# Patient Record
Sex: MALE | Race: WHITE | Employment: OTHER | URBAN - METROPOLITAN AREA
[De-identification: names, ages, dates, MRNs, and addresses within clinical notes are randomized per-mention and may not be internally consistent; named-entity substitution may affect disease eponyms.]

---

## 2020-08-20 ENCOUNTER — HOSPITAL ENCOUNTER (OUTPATIENT)
Dept: NUCLEAR MEDICINE | Age: 70
Discharge: HOME | End: 2020-08-20
Attending: UROLOGY
Payer: MEDICARE

## 2020-08-20 DIAGNOSIS — N13.30 HYDRONEPHROSIS: ICD-10-CM

## 2020-08-20 DIAGNOSIS — Q62.39 UNSPECIFIED CONGENITAL OBSTRUCTIVE DEFECT OF RENAL PELVIS AND URETER: ICD-10-CM

## 2020-08-20 PROCEDURE — 78708 K FLOW/FUNCT IMAGE W/DRUG: CPT

## 2020-08-20 PROCEDURE — 74011250636 HC RX REV CODE- 250/636: Performed by: UROLOGY

## 2020-08-20 RX ORDER — FUROSEMIDE 10 MG/ML
20 INJECTION INTRAMUSCULAR; INTRAVENOUS ONCE
Status: COMPLETED | OUTPATIENT
Start: 2020-08-20 | End: 2020-08-20

## 2020-08-20 RX ADMIN — FUROSEMIDE 20 MG: 10 INJECTION, SOLUTION INTRAMUSCULAR; INTRAVENOUS at 13:35

## 2021-07-12 ENCOUNTER — OFFICE VISIT (OUTPATIENT)
Dept: ONCOLOGY | Age: 71
End: 2021-07-12
Payer: MEDICARE

## 2021-07-12 VITALS
RESPIRATION RATE: 14 BRPM | HEART RATE: 52 BPM | DIASTOLIC BLOOD PRESSURE: 73 MMHG | SYSTOLIC BLOOD PRESSURE: 123 MMHG | HEIGHT: 70 IN | TEMPERATURE: 98 F | WEIGHT: 146.8 LBS | OXYGEN SATURATION: 99 % | BODY MASS INDEX: 21.02 KG/M2

## 2021-07-12 DIAGNOSIS — D70.8 OTHER NEUTROPENIA (HCC): ICD-10-CM

## 2021-07-12 DIAGNOSIS — Z87.442 HISTORY OF RENAL CALCULI: ICD-10-CM

## 2021-07-12 DIAGNOSIS — D53.9 MACROCYTIC ANEMIA: Primary | ICD-10-CM

## 2021-07-12 PROCEDURE — 99204 OFFICE O/P NEW MOD 45 MIN: CPT | Performed by: INTERNAL MEDICINE

## 2021-07-12 PROCEDURE — G0463 HOSPITAL OUTPT CLINIC VISIT: HCPCS | Performed by: INTERNAL MEDICINE

## 2021-07-12 RX ORDER — HYDROCORTISONE 25 MG/G
CREAM TOPICAL
COMMUNITY
Start: 2021-04-04

## 2021-07-12 RX ORDER — GLUCOSAMINE SULFATE 1500 MG
POWDER IN PACKET (EA) ORAL DAILY
COMMUNITY

## 2021-07-12 RX ORDER — DOXYCYCLINE HYCLATE 50 MG/1
CAPSULE ORAL
COMMUNITY
Start: 2021-07-10

## 2021-07-12 RX ORDER — ASPIRIN 81 MG/1
TABLET ORAL DAILY
COMMUNITY

## 2021-07-12 RX ORDER — ENALAPRIL MALEATE 20 MG/1
TABLET ORAL
COMMUNITY
Start: 2021-05-12

## 2021-07-12 RX ORDER — BISMUTH SUBSALICYLATE 262 MG
1 TABLET,CHEWABLE ORAL DAILY
COMMUNITY

## 2021-07-12 RX ORDER — MULTIVITAMIN
2000 TABLET ORAL 3 TIMES DAILY
COMMUNITY

## 2021-07-12 RX ORDER — CHLORTHALIDONE 25 MG/1
TABLET ORAL
COMMUNITY
Start: 2021-05-12

## 2021-07-12 NOTE — PROGRESS NOTES
Chief Complaint   Patient presents with    New Patient    Other     low WBC count       Visit Vitals  /73 (BP 1 Location: Right arm, BP Patient Position: Sitting, BP Cuff Size: Small adult)   Pulse (!) 52   Temp 98 °F (36.7 °C) (Temporal)   Resp 14   Ht 5' 10\" (1.778 m)   Wt 146 lb 12.8 oz (66.6 kg)   SpO2 99%   BMI 21.06 kg/m²       1. Have you been to the ER, urgent care clinic since your last visit? Hospitalized since your last visit? No    2. Have you seen or consulted any other health care providers outside of the [address] [place] [address] since your last visit? Include any pap smears or colon screening.   YES, PCP and

## 2021-07-12 NOTE — LETTER
7/15/2021    Patient: Wing Dixon   YOB: 1950   Date of Visit: 7/12/2021     Lionel Bedolla MD  [address] [place] [address]  Via Fax: [phone]    Dear Lionel Bedolla MD,      Thank you for referring Mr. Carmen Shipley to [facility] for evaluation. My notes for this consultation are attached. If you have questions, please do not hesitate to call me. I look forward to following your patient along with you.       Sincerely,    Siomara Morin MD

## 2021-07-12 NOTE — PROGRESS NOTES
[facility] at [address], [address] [place] [address] [address]  Ladonna Millet: [phone]  F: [phone] Patient ID  Name: Coni Groves  YOB: 1950  MRN: [id number]  Referring Provider:   No referring provider defined for this encounter. Primary Care Provider:   Declan Castellanos MD       [facility] PATIENT VISIT   Date of Visit: 07/12/21  Reason for Visit:     Chief Complaint   Patient presents with    New Patient    Other     low WBC count     Subjective:   Coni Groves is a 73 y.o. M who presents for an initial evaluation for leukopenia. Patient reports that he is doing well overall. He reports adequate oral intake of food and hydration. Patient denies any bowel or bladder problems.  -He states that his lower blood counts started last Fall 2020 when his hemoglobin A1c. He note iced lower blood counts back in late 2020.   -This Spring he unfortunately dealt with renal calculi. He reports chronic kidney disease with a left kidney issue for years. He reportedly has a history of hydronephrosis. He reportedly had issues with pain in his left kidney as a 10 yr old. He reportedly was hospitalized and found to have imaging studies which led to ureter surgery at age 17 years. He notes resultant hypertension.  -He reports weight loss; attributes to the issues with his kidney stones. He notes 15 lbs. Weight loss; reports that he has slowly regained his weight.  -Denies any night sweats. Denies any chest pain. Denies any headaches.  -Denies any lymphadenopathy. He reports that he has had petechiae on his face. Denies any pruritus. -Reports no known chronic hepatitis C but reportedly serology positive for hepatitis C. Unclear if he had PCR testing but reports following with a hepatologist in the past.  -He reports a good appetite. New Patient  The history is provided by the patient and medical records.  Pertinent negatives include no chest pain, no abdominal pain and no shortness of breath. Other  Pertinent negatives include no chest pain, no abdominal pain and no shortness of breath. Past Medical History:   Diagnosis Date    Hepatitis C 2000    Prostate cancer [facility])       Past Surgical History:   Procedure Laterality Date    HX CYSTECTOMY      HX OTHER SURGICAL  1964    pyeloplasty    HX PROSTATECTOMY  2004    HX TONSILLECTOMY  1953      Social History     Tobacco Use    Smoking status: Never Smoker    Smokeless tobacco: Never Used   Substance Use Topics    Alcohol use: Not Currently      Family History   Problem Relation Age of Onset    Cancer Father         lung cancer    Cancer Paternal Uncle         intestinal cancer    Diabetes Paternal Uncle      Current Outpatient Medications   Medication Sig    chlorthalidone (HYGROTON) 25 mg tablet TAKE 1 TABLET BY MOUTH EVERY DAY    enalapril (VASOTEC) 20 mg tablet TAKE 1 TABLET BY MOUTH TWICE A DAY    doxycycline (VIBRAMYCIN) 50 mg capsule     hydrocortisone (HYTONE) 2.5 % topical cream APPLY TO FACE ON RASH EVERY DAY    multivitamin (ONE A DAY) tablet Take 1 Tablet by mouth daily.  cholecalciferol (Vitamin D3) 25 mcg (1,000 unit) cap Take  by mouth daily.  aspirin delayed-release 81 mg tablet Take  by mouth daily.  psyllium (METAMUCIL) packet Take 1 Packet by mouth daily.  cinnamon bark (Cinnamon) 500 mg cap Take 2,000 mg by mouth three (3) times daily. No current facility-administered medications for this visit. Allergies   Allergen Reactions    Other Food Other (comments)     Topical antibiotics - blistering of skin      Review of Systems   Constitutional: Positive for weight loss. Negative for fever. HENT: Negative for nosebleeds. Eyes: Negative for blurred vision. Respiratory: Negative for cough, hemoptysis and shortness of breath. Cardiovascular: Negative for chest pain and leg swelling. Gastrointestinal: Negative for abdominal pain, blood in stool and melena. Genitourinary: Negative for dysuria and hematuria. Musculoskeletal: Negative for joint pain and myalgias. Skin: Negative for itching and rash. Neurological: Negative for tremors and focal weakness. Objective:     Visit Vitals  /73 (BP 1 Location: Right arm, BP Patient Position: Sitting, BP Cuff Size: Small adult)   Pulse (!) 52   Temp 98 °F (36.7 °C) (Temporal)   Resp 14   Ht 5' 10\" (1.778 m)   Wt 146 lb 12.8 oz (66.6 kg)   SpO2 99%   BMI 21.06 kg/m²     ECOG PS: 1- Restricted in physically strenuous activity but ambulatory and able to carry out work of a light or sedentary nature, e.g., light house work, office work. Physical Exam  Constitutional:       General: He is not in acute distress. Appearance: He is not ill-appearing, toxic-appearing or diaphoretic. Eyes:      General: No scleral icterus. Conjunctiva/sclera: Conjunctivae normal.   Cardiovascular:      Heart sounds: Normal heart sounds. No friction rub. No gallop. Pulmonary:      Effort: No respiratory distress. Breath sounds: No wheezing, rhonchi or rales. Abdominal:      General: Bowel sounds are normal.      Palpations: Abdomen is soft. Tenderness: There is no abdominal tenderness. There is no guarding. Musculoskeletal:      Comments: No muscle pain on palpation. No temporal muscle wasting on inspection. Lymphadenopathy:      Comments: No cervical, supraclavicular,axillary, or inguinal lymph node enlargement or tenderness. Skin:     Coloration: Skin is not jaundiced. Findings: No rash. Neurological:      Mental Status: He is oriented to person, place, and time. Comments: Patient with normal gait. Psychiatric:         Mood and Affect: Mood normal.         Behavior: Behavior normal.     Results:     I personally reviewed referral labs: Patient brought his labs from his referring provider. Hx of mild neutropenia with ANC at 1500.   Hgb has waxed/waned in 12-13 range over past since November 2020.  I personally reviewed referral provider notes: I reviewed the lab results letter from Irvin Lewis MD to patient. It essentially mentions a Hemoglobin at 12.5 in June 2021 at the time of the results letter on 6/30/21. Assessment and Recommendations:   Diagnoses and all orders for this visit:    1. Macrocytic anemia   63-year-old male with a history of macrocytic anemia presents the hematology clinic for further evaluation. Discussed with patient I recommend we order a wide differential work-up for his anemia. We discussed that he has mild anemia. We discussed that anemia can be a production issue, destruction issue, blood loss issue. I would recommend he follow-up with his primary care provider regarding age-appropriate screening. Discussed that I cannot exclude an occult MDS or leukemia although there have been no clear signs of this at present. We discussed that I would not recommend a bone marrow biopsy with only mild cytopenias. I recommend observation at this time but surely if there are signs/symptoms concerning for progressive cytopenias then I would request that his other providers refer him to us sooner as needed. -     CBC WITH AUTOMATED DIFF; Future  -     PERIPHERAL SMEAR; Future  -     IRON PROFILE; Future  -     FERRITIN; Future  -     VITAMIN B12; Future  -     FOLATE; Future  -     RETICULOCYTE COUNT; Future  -     HAPTOGLOBIN; Future  -     LD; Future  -     BILIRUBIN, FRACTIONATED; Future  -     DIRECT RUFINA; Future  -     SPEP AND EMILIANA, SERUM; Future    2. Other neutropenia [facility])  Discussed with patient that he barely meets the criteria for mild laboratory neutropenia. Have ordered a smear and CBC repeat. -     PERIPHERAL SMEAR; Future    3. History of renal calculi  -I cannot exclude any underlying microscopic hematuria that could contribute to anemia. However, his marrow should likely counter any RBC loss with compensatory production to offset minor blood loss.  Will proceed with above lab work. Follow-up with his other providers. Follow-up and Dispositions  ·   Return in about 4 months (around 11/12/2021).            Signed By:   Davene Bence, MD   Hematology/Medical Oncology Provider  [facility]

## 2021-07-15 PROBLEM — D70.8 OTHER NEUTROPENIA (HCC): Status: ACTIVE | Noted: 2021-07-15

## 2021-07-15 PROBLEM — D70.8 OTHER NEUTROPENIA (HCC): Status: ACTIVE | Noted: 2021-07-12

## 2021-07-15 PROBLEM — D53.9 MACROCYTIC ANEMIA: Status: ACTIVE | Noted: 2021-07-15

## 2021-07-15 PROBLEM — D53.9 MACROCYTIC ANEMIA: Status: ACTIVE | Noted: 2021-07-12

## 2021-07-15 PROBLEM — Z87.442 HISTORY OF RENAL CALCULI: Status: ACTIVE | Noted: 2021-07-12

## 2021-11-11 ENCOUNTER — OFFICE VISIT (OUTPATIENT)
Dept: ONCOLOGY | Age: 71
End: 2021-11-11
Payer: MEDICARE

## 2021-11-11 VITALS
TEMPERATURE: 96.9 F | OXYGEN SATURATION: 100 % | HEIGHT: 70 IN | HEART RATE: 50 BPM | WEIGHT: 159 LBS | SYSTOLIC BLOOD PRESSURE: 110 MMHG | BODY MASS INDEX: 22.76 KG/M2 | DIASTOLIC BLOOD PRESSURE: 60 MMHG | RESPIRATION RATE: 18 BRPM

## 2021-11-11 DIAGNOSIS — R53.83 OTHER FATIGUE: ICD-10-CM

## 2021-11-11 DIAGNOSIS — D72.818 OTHER DECREASED WHITE BLOOD CELL COUNT: ICD-10-CM

## 2021-11-11 DIAGNOSIS — D75.89 MACROCYTOSIS WITHOUT ANEMIA: Primary | ICD-10-CM

## 2021-11-11 DIAGNOSIS — Z86.19 HISTORY OF HEPATITIS C: ICD-10-CM

## 2021-11-11 PROCEDURE — 99213 OFFICE O/P EST LOW 20 MIN: CPT | Performed by: INTERNAL MEDICINE

## 2021-11-11 PROCEDURE — G8536 NO DOC ELDER MAL SCRN: HCPCS | Performed by: INTERNAL MEDICINE

## 2021-11-11 PROCEDURE — 3017F COLORECTAL CA SCREEN DOC REV: CPT | Performed by: INTERNAL MEDICINE

## 2021-11-11 PROCEDURE — G8420 CALC BMI NORM PARAMETERS: HCPCS | Performed by: INTERNAL MEDICINE

## 2021-11-11 PROCEDURE — G8510 SCR DEP NEG, NO PLAN REQD: HCPCS | Performed by: INTERNAL MEDICINE

## 2021-11-11 PROCEDURE — G8427 DOCREV CUR MEDS BY ELIG CLIN: HCPCS | Performed by: INTERNAL MEDICINE

## 2021-11-11 PROCEDURE — 1101F PT FALLS ASSESS-DOCD LE1/YR: CPT | Performed by: INTERNAL MEDICINE

## 2021-11-11 NOTE — PATIENT INSTRUCTIONS
Orders Only on 07/12/2021   Component Date Value Ref Range Status    Immunoglobulin G, Qt. 07/12/2021 692  603 - 1,613 mg/dL Final    Immunoglobulin A, Qt. 07/12/2021 208  61 - 437 mg/dL Final    Immunoglobulin M, Qt. 07/12/2021 78  20 - 172 mg/dL Final    Protein, total 07/12/2021 6.5  6.0 - 8.5 g/dL Final    Albumin 07/12/2021 4.3  2.9 - 4.4 g/dL Final    Alpha-1-Globulin 07/12/2021 0.2  0.0 - 0.4 g/dL Final    Alpha-2-Globulin 07/12/2021 0.5  0.4 - 1.0 g/dL Final    Beta Globulin 07/12/2021 0.8  0.7 - 1.3 g/dL Final    Gamma Globulin 07/12/2021 0.7  0.4 - 1.8 g/dL Final    M-Kingsley 07/12/2021 Not Observed  Not Observed g/dL Final    Globulin, total 07/12/2021 2.2  2.2 - 3.9 g/dL Final    A/G ratio 07/12/2021 2.0* 0.7 - 1.7   Final    Immunofixation Result 07/12/2021 Comment    Final    Comment: (NOTE)  The immunofixation pattern appears unremarkable. Evidence of  monoclonal protein is not apparent.  RAFAEL Poly 07/12/2021 NEG   Final    Bilirubin, total 07/12/2021 0.5  0.2 - 1.0 MG/DL Final    Bilirubin, direct 07/12/2021 0.1  0.0 - 0.2 MG/DL Final    Bilirubin, indirect 07/12/2021 0.4  0.0 - 1.1 MG/DL Final    LD 07/12/2021 168  85 - 241 U/L Final    Haptoglobin 07/12/2021 73  30 - 200 mg/dL Final    Reticulocyte count 07/12/2021 1.4  0.7 - 2.1 % Final    Absolute Retic Cnt. 07/12/2021 0.0556  0.0260 - 0.0950 M/ul Final    Folate 07/12/2021 37.6* 5.0 - 21.0 ng/mL Final    Vitamin B12 07/12/2021 539  193 - 986 pg/mL Final    Ferritin 07/12/2021 84  26 - 388 NG/ML Final    Iron 07/12/2021 100  35 - 150 ug/dL Final    TIBC 07/12/2021 330  250 - 450 ug/dL Final    Iron % saturation 07/12/2021 30  20 - 50 % Final    PERIPHERAL SMEAR 07/12/2021 Pathologic examination results can be viewed in [facility] Chart Review under the Pathology tab.     Final    SEE COPATH CASE     WBC 07/12/2021 3.5* 4.1 - 11.1 K/uL Final    RBC 07/12/2021 3.97* 4.10 - 5.70 M/uL Final    HGB 07/12/2021 13.2  12.1 - 17.0 g/dL Final    HCT 07/12/2021 40.5  36.6 - 50.3 % Final    MCV 07/12/2021 102.0* 80.0 - 99.0 FL Final    MCH 07/12/2021 33.2  26.0 - 34.0 PG Final    MCHC 07/12/2021 32.6  30.0 - 36.5 g/dL Final    RDW 07/12/2021 11.7  11.5 - 14.5 % Final    PLATELET 72/64/2866 104  150 - 400 K/uL Final    MPV 07/12/2021 12.9  8.9 - 12.9 FL Final    NRBC 07/12/2021 0.0  0  WBC Final    ABSOLUTE NRBC 07/12/2021 0.00  0.00 - 0.01 K/uL Final    NEUTROPHILS 07/12/2021 54  32 - 75 % Final    LYMPHOCYTES 07/12/2021 32  12 - 49 % Final    MONOCYTES 07/12/2021 9  5 - 13 % Final    EOSINOPHILS 07/12/2021 3  0 - 7 % Final    BASOPHILS 07/12/2021 2* 0 - 1 % Final    IMMATURE GRANULOCYTES 07/12/2021 0  0.0 - 0.5 % Final    ABS. NEUTROPHILS 07/12/2021 1.9  1.8 - 8.0 K/UL Final    ABS. LYMPHOCYTES 07/12/2021 1.1  0.8 - 3.5 K/UL Final    ABS. MONOCYTES 07/12/2021 0.3  0.0 - 1.0 K/UL Final    ABS. EOSINOPHILS 07/12/2021 0.1  0.0 - 0.4 K/UL Final    ABS. BASOPHILS 07/12/2021 0.1  0.0 - 0.1 K/UL Final    ABS. IMM. GRANS. 07/12/2021 0.0  0.00 - 0.04 K/UL Final    DF 07/12/2021 AUTOMATED    Final     * * *FINAL PATHOLOGIC DIAGNOSIS* * *          Blood, smear:        Normochromic macrocytic erythrocytes. There is no significant   anisocytosis and there is no polychromasia or nucleated red blood cells. Schistocytes are not identified. Neutrophils are normal in number with few   smaller hypolobated hypogranular forms. Hypolobated eosinophils are also   noted. Morphologically unremarkable monocytes and lymphocytes are normal   in number. Platelets unremarkable. AOE6/2/57/9450   *Electronically Signed Out By Leyda Love. Diogenes Mae MD*       If you are having difficulty with continuity in your primary care here is a website for the primary care practice nearby    [phone]    [email or web address]

## 2021-11-11 NOTE — PROGRESS NOTES
[facility] at [address], [address] [facility] [address] [facility] [phone]  F: [phone] Patient ID  Name: Dereck Robledo  YOB: 1950  MRN: [id number]  Referring Provider:   No referring provider defined for this encounter. Primary Care Provider:   Veronica Edmonds MD       HEMATOLOGY/MEDICAL ONCOLOGY  NOTE   Date of Visit: 11/11/21  Reason for Evaluation:     Chief Complaint   Patient presents with    Follow-up     4 mo     Subjective:     History of Present Illness:     Dereck Robledo is a 73 y.o. M who presents for a follow-up evaluation for macrocytic anemia history and leukopenia. Fatigue:Grade 1 and Urinary Frequency/Urgency:Grade 1  Denies any known history of Hepatitis C chronic infection. He may have had a Hepatitis C exposure/infection reportedly. He had a blood [place]. Reports that he has had some chronic urinary issues that contribute to nocturia that interferes with his rest.  Overall, doing well; reportedly having high turnover in his primary care office. Past Medical History:   Diagnosis Date    Exposure to hepatitis C     Reportedly [facility] by [facility] (Dr. Claude Sato); reportedly never tested positive but had antibody positiviity. Had a blood exposure [place].     Kidney disease     Prostate cancer [facility])       Past Surgical History:   Procedure Laterality Date    HX CYSTECTOMY      HX OTHER SURGICAL  1964    pyeloplasty    HX PROSTATECTOMY  2004    HX TONSILLECTOMY  1953      Social History     Tobacco Use    Smoking status: Never Smoker    Smokeless tobacco: Never Used   Substance Use Topics    Alcohol use: Not Currently      Family History   Problem Relation Age of Onset    Cancer Father         lung cancer    Cancer Paternal Uncle         intestinal cancer    Diabetes Paternal Uncle     Cancer Paternal Grandmother         throat cancer    Cancer Paternal Grandfather         intestinal cancer     Current Outpatient Medications   Medication Sig    chlorthalidone (HYGROTON) 25 mg tablet TAKE 1 TABLET BY MOUTH EVERY DAY    enalapril (VASOTEC) 20 mg tablet TAKE 1 TABLET BY MOUTH TWICE A DAY    doxycycline (VIBRAMYCIN) 50 mg capsule     hydrocortisone (HYTONE) 2.5 % topical cream APPLY TO FACE ON RASH EVERY DAY    multivitamin (ONE A DAY) tablet Take 1 Tablet by mouth daily.  cholecalciferol (Vitamin D3) 25 mcg (1,000 unit) cap Take  by mouth daily.  aspirin delayed-release 81 mg tablet Take  by mouth daily.  psyllium (METAMUCIL) packet Take 1 Packet by mouth daily.  cinnamon bark (Cinnamon) 500 mg cap Take 2,000 mg by mouth three (3) times daily. No current facility-administered medications for this visit. Allergies   Allergen Reactions    Other Food Other (comments)     Topical antibiotics - blistering of skin      Review of Systems: A complete review of systems was obtained, reviewed. Pertinent findings reviewed above. Objective:     Visit Vitals  /60   Pulse (!) 50   Temp 96.9 °F (36.1 °C) (Oral)   Resp 18   Ht 5' 10\" (1.778 m)   Wt 159 lb (72.1 kg)   SpO2 100%   BMI 22.81 kg/m²     ECOG PS: 0- Fully active, able to carry on all pre-disease performance without restriction. Physical Exam  Constitutional: No acute distress. , Non-toxic appearance. and Non-diaphoretic. HENT: Normocephalic and atraumatic head. Eyes: Normal Conjunctivae. and Anicteric sclerae. Cardiovascular: Normal heart sounds., No pitting edema., No friction rub. and No gallops auscultated. Pulmonary: Normal Respiratory Effort., No wheezing., No rhonchi. and No rales. Abdominal: Normal bowel sounds. , Soft Abdomen to palpation. and No abdominal tenderness. Skin: No jaundice. and No rash. Musculoskeletal: No muscle pain on palpation. No temporal muscle wasting on inspection. Lymph: No cervical, supraclavicular lymph node enlargement or tenderness.   Neurological: Alert and oriented to person, place, and time. Mental status is at baseline. and No tremor on inspection. Psychiatric: mood normal. normal speech rate and normal affect    Results:     I personally reviewed Epic EHR labs/results below ordered at last visit:  Orders Only on 07/12/2021   Component Date Value Ref Range Status    Immunoglobulin G, Qt. 07/12/2021 692  603 - 1,613 mg/dL Final    Immunoglobulin A, Qt. 07/12/2021 208  61 - 437 mg/dL Final    Immunoglobulin M, Qt. 07/12/2021 78  20 - 172 mg/dL Final    Protein, total 07/12/2021 6.5  6.0 - 8.5 g/dL Final    Albumin 07/12/2021 4.3  2.9 - 4.4 g/dL Final    Alpha-1-Globulin 07/12/2021 0.2  0.0 - 0.4 g/dL Final    Alpha-2-Globulin 07/12/2021 0.5  0.4 - 1.0 g/dL Final    Beta Globulin 07/12/2021 0.8  0.7 - 1.3 g/dL Final    Gamma Globulin 07/12/2021 0.7  0.4 - 1.8 g/dL Final    M-Kingsley 07/12/2021 Not Observed  Not Observed g/dL Final    Globulin, total 07/12/2021 2.2  2.2 - 3.9 g/dL Final    A/G ratio 07/12/2021 2.0* 0.7 - 1.7   Final    Immunofixation Result 07/12/2021 Comment    Final    Comment: (NOTE)  The immunofixation pattern appears unremarkable. Evidence of  monoclonal protein is not apparent.       RAFAEL Poly 07/12/2021 NEG   Final    Bilirubin, total 07/12/2021 0.5  0.2 - 1.0 MG/DL Final    Bilirubin, direct 07/12/2021 0.1  0.0 - 0.2 MG/DL Final    Bilirubin, indirect 07/12/2021 0.4  0.0 - 1.1 MG/DL Final    LD 07/12/2021 168  85 - 241 U/L Final    Haptoglobin 07/12/2021 73  30 - 200 mg/dL Final    Reticulocyte count 07/12/2021 1.4  0.7 - 2.1 % Final    Absolute Retic Cnt. 07/12/2021 0.0556  0.0260 - 0.0950 M/ul Final    Folate 07/12/2021 37.6* 5.0 - 21.0 ng/mL Final    Vitamin B12 07/12/2021 539  193 - 986 pg/mL Final    Ferritin 07/12/2021 84  26 - 388 NG/ML Final    Iron 07/12/2021 100  35 - 150 ug/dL Final    TIBC 07/12/2021 330  250 - 450 ug/dL Final    Iron % saturation 07/12/2021 30  20 - 50 % Final    PERIPHERAL SMEAR 07/12/2021 Pathologic examination results can be viewed in [facility] Chart Review under the Pathology tab. Final    SEE COPATH CASE     WBC 07/12/2021 3.5* 4.1 - 11.1 K/uL Final    RBC 07/12/2021 3.97* 4.10 - 5.70 M/uL Final    HGB 07/12/2021 13.2  12.1 - 17.0 g/dL Final    HCT 07/12/2021 40.5  36.6 - 50.3 % Final    MCV 07/12/2021 102.0* 80.0 - 99.0 FL Final    MCH 07/12/2021 33.2  26.0 - 34.0 PG Final    MCHC 07/12/2021 32.6  30.0 - 36.5 g/dL Final    RDW 07/12/2021 11.7  11.5 - 14.5 % Final    PLATELET 88/72/6180 122  150 - 400 K/uL Final    MPV 07/12/2021 12.9  8.9 - 12.9 FL Final    NRBC 07/12/2021 0.0  0  WBC Final    ABSOLUTE NRBC 07/12/2021 0.00  0.00 - 0.01 K/uL Final    NEUTROPHILS 07/12/2021 54  32 - 75 % Final    LYMPHOCYTES 07/12/2021 32  12 - 49 % Final    MONOCYTES 07/12/2021 9  5 - 13 % Final    EOSINOPHILS 07/12/2021 3  0 - 7 % Final    BASOPHILS 07/12/2021 2* 0 - 1 % Final    IMMATURE GRANULOCYTES 07/12/2021 0  0.0 - 0.5 % Final    ABS. NEUTROPHILS 07/12/2021 1.9  1.8 - 8.0 K/UL Final    ABS. LYMPHOCYTES 07/12/2021 1.1  0.8 - 3.5 K/UL Final    ABS. MONOCYTES 07/12/2021 0.3  0.0 - 1.0 K/UL Final    ABS. EOSINOPHILS 07/12/2021 0.1  0.0 - 0.4 K/UL Final    ABS. BASOPHILS 07/12/2021 0.1  0.0 - 0.1 K/UL Final    ABS. IMM. GRANS. 07/12/2021 0.0  0.00 - 0.04 K/UL Final    DF 07/12/2021 AUTOMATED    Final   * * *FINAL PATHOLOGIC DIAGNOSIS* * *          Blood, smear:        Normochromic macrocytic erythrocytes. There is no significant   anisocytosis and there is no polychromasia or nucleated red blood cells. Schistocytes are not identified. Neutrophils are normal in number with few   smaller hypolobated hypogranular forms. Hypolobated eosinophils are also   noted. Morphologically unremarkable monocytes and lymphocytes are normal   in number. Platelets unremarkable. IVK4/2/59/0042   *Electronically Signed Out By Alma Parks.  Stephen Merritt MD*       Assessment and Recommendations:     Diagnoses and all orders for this visit:    1. Macrocytosis without anemia  No further work-up currently planned. Patient will follow-up with us in about 6 months to make sure he transitions to a new PCP. We may repeat a CBC at next visit. 2. Other decreased white blood cell count  Neutrophils normal; slight decrease in total WBC. 3. History of hepatitis C  Reportedly was evaluated by [address]; he states that he did not have any evidence of disease. I don't have any record of this and it remains unclear that we will be able to obtain these records. He can discuss this further in primary care in the event they would want to repeat any Hep C PCR testing. 4. Other fatigue  -     CBC WITH AUTOMATED DIFF; Future  -     TSH 3RD GENERATION; Future  Will repeat CBC, check TSH especially with macrocytosis history. Follow-up and Dispositions    · Return in about 6 months (around 5/11/2022).            Signed By:   Marely Marks MD

## 2021-11-11 NOTE — PROGRESS NOTES
Zach Mesa is a 73 y.o. male    Chief Complaint   Patient presents with    Follow-up     4 mo       Visit Vitals  /60   Pulse (!) 50   Temp 96.9 °F (36.1 °C) (Oral)   Resp 18   Ht 5' 10\" (1.778 m)   Wt 159 lb (72.1 kg)   SpO2 100%   BMI 22.81 kg/m²       3 most recent PHQ Screens 11/11/2021   Little interest or pleasure in doing things Not at all   Feeling down, depressed, irritable, or hopeless Not at all   Total Score PHQ 2 0       Fall Risk Assessment, last 12 mths 11/11/2021   Able to walk? No   Fall in past 12 months? -   Do you feel unsteady? -   Are you worried about falling -   Is TUG test greater than 12 seconds? -   Is the gait abnormal? -   Number of falls in past 12 months -   Fall with injury? -       No flowsheet data found. 1. Have you been to the ER, urgent care clinic since your last visit? Hospitalized since your last visit? No     2. Have you seen or consulted any other health care providers outside of the [address] [place] [address] since your last visit? Include any pap smears or colon screening.  No

## 2021-11-19 PROBLEM — R53.83 OTHER FATIGUE: Status: ACTIVE | Noted: 2021-11-19

## 2021-11-19 PROBLEM — R53.83 OTHER FATIGUE: Status: ACTIVE | Noted: 2021-11-11

## 2022-03-18 PROBLEM — D70.8 OTHER NEUTROPENIA (HCC): Status: ACTIVE | Noted: 2021-07-12

## 2022-03-19 PROBLEM — Z87.442 HISTORY OF RENAL CALCULI: Status: ACTIVE | Noted: 2021-07-12

## 2022-03-19 PROBLEM — D53.9 MACROCYTIC ANEMIA: Status: ACTIVE | Noted: 2021-07-12

## 2022-03-19 PROBLEM — R53.83 OTHER FATIGUE: Status: ACTIVE | Noted: 2021-11-11

## 2022-03-20 PROBLEM — Z86.19 HISTORY OF HEPATITIS C: Status: ACTIVE | Noted: 2021-11-11

## 2022-05-12 ENCOUNTER — OFFICE VISIT (OUTPATIENT)
Dept: ONCOLOGY | Age: 72
End: 2022-05-12
Payer: MEDICARE

## 2022-05-12 VITALS
TEMPERATURE: 97.5 F | HEART RATE: 48 BPM | RESPIRATION RATE: 16 BRPM | DIASTOLIC BLOOD PRESSURE: 54 MMHG | WEIGHT: 152.2 LBS | BODY MASS INDEX: 21.79 KG/M2 | HEIGHT: 70 IN | SYSTOLIC BLOOD PRESSURE: 102 MMHG | OXYGEN SATURATION: 100 %

## 2022-05-12 DIAGNOSIS — R79.9 ABNORMAL BLOOD SMEAR: ICD-10-CM

## 2022-05-12 DIAGNOSIS — D75.89 MACROCYTOSIS WITHOUT ANEMIA: Primary | ICD-10-CM

## 2022-05-12 PROCEDURE — 3017F COLORECTAL CA SCREEN DOC REV: CPT | Performed by: INTERNAL MEDICINE

## 2022-05-12 PROCEDURE — G8420 CALC BMI NORM PARAMETERS: HCPCS | Performed by: INTERNAL MEDICINE

## 2022-05-12 PROCEDURE — G8427 DOCREV CUR MEDS BY ELIG CLIN: HCPCS | Performed by: INTERNAL MEDICINE

## 2022-05-12 PROCEDURE — G8536 NO DOC ELDER MAL SCRN: HCPCS | Performed by: INTERNAL MEDICINE

## 2022-05-12 PROCEDURE — G0463 HOSPITAL OUTPT CLINIC VISIT: HCPCS | Performed by: INTERNAL MEDICINE

## 2022-05-12 PROCEDURE — 1101F PT FALLS ASSESS-DOCD LE1/YR: CPT | Performed by: INTERNAL MEDICINE

## 2022-05-12 PROCEDURE — G8510 SCR DEP NEG, NO PLAN REQD: HCPCS | Performed by: INTERNAL MEDICINE

## 2022-05-12 PROCEDURE — 99213 OFFICE O/P EST LOW 20 MIN: CPT | Performed by: INTERNAL MEDICINE

## 2022-05-12 RX ORDER — DESONIDE 0.5 MG/G
CREAM TOPICAL
COMMUNITY
End: 2022-05-12

## 2022-05-12 NOTE — PROGRESS NOTES
[facility] [place] at [address], [address] [facility]: [phone]  F: [phone] Patient ID  Name: Tootie Parker  YOB: 1950  MRN: [id number]  Referring Provider:   No referring provider defined for this encounter. Primary Care Provider:   Virginie Varela MD       HEMATOLOGY/MEDICAL ONCOLOGY  NOTE   Date of Visit: 05/12/22  Reason for Evaluation:     Chief Complaint   Patient presents with    Follow-up     Patient presents as a follow-up for history of macrocytic anemia and leukopenia. He denies pain. Subjective:     History of Present Illness:     Tootie Parker is a 81 y.o. M who presents for a follow-up evaluation for macrocytosis and other low white blood cell count. Patient overall reports feeling stable. He reports feeling well. He is without any clear infections. He reports that he is without any significant infections. He denies any presyncopal complaints with his bradycardia. He is following with cardiology and has weighed consideration for a pacemaker but has decided for now not to pursue. Fatigue:Grade 1  Insomnia:Grade 1  Rapid Heartbeat:Grade 1  Urinary Frequency/Urgency:Grade 1  Erectile Impotence:Grade 3      Past Medical History:   Diagnosis Date    Exposure to hepatitis C     Reportedly [facility] by [facility] (Dr. Myrna Can); reportedly never tested positive but had antibody positiviity. 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     Kidney disease     Osteopenia     Prostate cancer [facility])       Past Surgical History:   Procedure Laterality Date    HX CYSTECTOMY      HX OTHER SURGICAL  1964    pyeloplasty    HX PROSTATECTOMY  2004    HX TONSILLECTOMY  1953      Social History     Tobacco Use    Smoking status: Never Smoker    Smokeless tobacco: Never Used   Substance Use Topics    Alcohol use: Not Currently      Family History   Problem Relation Age of Onset    Cancer Father         lung cancer    Cancer Paternal Uncle         intestinal cancer    Diabetes Paternal Uncle     Cancer Paternal Grandmother         throat cancer    Cancer Paternal Grandfather         intestinal cancer     Current Outpatient Medications   Medication Sig    enalapril (VASOTEC) 20 mg tablet TAKE 1 TABLET BY MOUTH TWICE A DAY    doxycycline (VIBRAMYCIN) 50 mg capsule     hydrocortisone (HYTONE) 2.5 % topical cream APPLY TO FACE ON RASH EVERY DAY    multivitamin (ONE A DAY) tablet Take 1 Tablet by mouth daily.  cholecalciferol (Vitamin D3) 25 mcg (1,000 unit) cap Take  by mouth daily.  cinnamon bark (Cinnamon) 500 mg cap Take 2,000 mg by mouth three (3) times daily.  chlorthalidone (HYGROTON) 25 mg tablet TAKE 1 TABLET BY MOUTH EVERY DAY    aspirin delayed-release 81 mg tablet Take  by mouth daily. (Patient not taking: Reported on 5/12/2022)    psyllium (METAMUCIL) packet Take 1 Packet by mouth daily. (Patient not taking: Reported on 5/12/2022)     No current facility-administered medications for this visit. Allergies   Allergen Reactions    Other Food Other (comments)     Topical antibiotics - blistering of skin    Other Plant, Animal, Environmental Sneezing     Runny nose, watery eyes      Review of Systems Provided by:  Patient  Review of Systems: A complete review of systems was obtained, reviewed. Pertinent findings reviewed above. Objective:     Visit Vitals  BP (!) 102/54   Pulse (!) 48   Temp 97.5 °F (36.4 °C)   Resp 16   Ht 5' 10\" (1.778 m)   Wt 152 lb 3.2 oz (69 kg)   SpO2 100%   BMI 21.84 kg/m²     ECOG PS: 0- Fully active, able to carry on all pre-disease performance without restriction. Physical Exam  Constitutional: No acute distress. and Non-toxic appearance. HENT: Normocephalic and atraumatic head. and Wearing a mask during COVID-19 precautions. Eyes: Normal Conjunctivae. Anicteric sclerae. Cardiovascular: S1,S2 auscultated. No pitting edema. No friction rub. Pulmonary: Normal Respiratory Effort. No wheezing. No rhonchi. No rales. Abdominal: Normal bowel sounds. Soft Abdomen to palpation. No abdominal tenderness. No hepatosplenomegaly. Skin: No jaundice. No rash. No petechiae. Musculoskeletal: No muscle pain on palpation. No temporal muscle wasting on inspection. Lymph: No cervical, supraclavicular,axillary, or inguinal lymph node enlargement or tenderness. Neurological: Alert and oriented. No tremor on inspection. Normal Gait. Psychiatric: mood normal. normal speech rate normal affect    Results:     I personally reviewed Epic EHR labs/results below ordered at last visit:  No visits with results within 5 Month(s) from this visit. Latest known visit with results is:   Orders Only on 11/11/2021   Component Date Value Ref Range Status    TSH 11/11/2021 2.10  0.36 - 3.74 uIU/mL Final    Comment:   Due to TSH heterogeneity, both structurally and degree of glycosylation,  monoclonal antibodies used in the TSH assay may not accurately quantitate TSH.   Therefore, this result should be correlated with clinical findings as well as  with other assessments of thyroid function, e.g., free T4, free T3.      WBC 11/11/2021 4.0* 4.1 - 11.1 K/uL Final    RBC 11/11/2021 3.77* 4.10 - 5.70 M/uL Final    HGB 11/11/2021 12.6  12.1 - 17.0 g/dL Final    HCT 11/11/2021 37.8  36.6 - 50.3 % Final    MCV 11/11/2021 100.3* 80.0 - 99.0 FL Final    MCH 11/11/2021 33.4  26.0 - 34.0 PG Final    MCHC 11/11/2021 33.3  30.0 - 36.5 g/dL Final    RDW 11/11/2021 11.8  11.5 - 14.5 % Final    PLATELET 14/38/3326 827  150 - 400 K/uL Final    MPV 11/11/2021 12.8  8.9 - 12.9 FL Final    NRBC 11/11/2021 0.0  0  WBC Final    ABSOLUTE NRBC 11/11/2021 0.00  0.00 - 0.01 K/uL Final    NEUTROPHILS 11/11/2021 50  32 - 75 % Final    LYMPHOCYTES 11/11/2021 32  12 - 49 % Final    MONOCYTES 11/11/2021 10  5 - 13 % Final    EOSINOPHILS 11/11/2021 6  0 - 7 % Final    BASOPHILS 11/11/2021 2* 0 - 1 % Final    IMMATURE GRANULOCYTES 11/11/2021 0  0.0 - 0.5 % Final    ABS. NEUTROPHILS 11/11/2021 2.0  1.8 - 8.0 K/UL Final    ABS. LYMPHOCYTES 11/11/2021 1.3  0.8 - 3.5 K/UL Final    ABS. MONOCYTES 11/11/2021 0.4  0.0 - 1.0 K/UL Final    ABS. EOSINOPHILS 11/11/2021 0.2  0.0 - 0.4 K/UL Final    ABS. BASOPHILS 11/11/2021 0.1  0.0 - 0.1 K/UL Final    ABS. IMM. GRANS. 11/11/2021 0.0  0.00 - 0.04 K/UL Final    DF 11/11/2021 AUTOMATED    Final   * * *FINAL PATHOLOGIC DIAGNOSIS* * *          Blood, smear:        Normochromic macrocytic erythrocytes. There is no significant   anisocytosis and there is no polychromasia or nucleated red blood cells. Schistocytes are not identified. Neutrophils are normal in number with few   smaller hypolobated hypogranular forms. Hypolobated eosinophils are also   noted. Morphologically unremarkable monocytes and lymphocytes are normal   in number. Platelets unremarkable. [id number]   *Electronically Signed Out By Olga Wallis. Linda Floyd MD*       Assessment and Recommendations:     1. Macrocytosis without anemia  Slight macrocytosis but no anemia in November 2021. Will repeat CBC today. - CBC WITH AUTOMATED DIFF; Future  - PERIPHERAL SMEAR; Future    2. Abnormal blood smear  Discussed that July smear noted \"few hypolobated,hypogranulated neutrophils. \" Will repeat a smear but no plans for a bone marrow biopys today. Follow-up and Dispositions    · Return in about 6 months (around 11/12/2022).            Ethan Morelos MD  Hematology/Medical Oncology Provider  [facility]  P: [phone]      Signed By:   Queenie Orr MD

## 2022-05-12 NOTE — PROGRESS NOTES
1. Have you been to the ER, urgent care clinic since your last visit? Hospitalized since your last visit? No    2. Have you seen or consulted any other health care providers outside of the [address] [place] [address] since your last visit? Include any pap smears or colon screening. Yes Cardiology, Dr. Melo Carter, January and March of 2022, stress test and echocardiogram, seeing next month (June 2022) to discuss possible pacemaker        Vitals:    05/12/22 0840   BP: (!) 102/54   Pulse: (!) 48   Resp: 16   Temp: 97.5 °F (36.4 °C)   SpO2: 100%   Weight: 152 lb 3.2 oz (69 kg)   Height: 5' 10\" (1.778 m)           Chief Complaint   Patient presents with    Follow-up     Patient presents as a follow-up for history of macrocytic anemia and leukopenia. He denies pain.

## 2022-05-12 NOTE — LETTER
5/12/2022    Patient: Maddison Cline   YOB: 1950   Date of Visit: 5/12/2022     Dion Schafer MD  [address] [facility] [address]  Via Fax: [phone]    Dear Dion Schafer MD,      Thank you for referring Mr. Aaron Burns to [facility] for evaluation. My notes for this consultation are attached. If you have questions, please do not hesitate to call me. I look forward to following your patient along with you.       Sincerely,    Oneyda Simmons MD

## 2022-05-16 ENCOUNTER — TELEPHONE (OUTPATIENT)
Dept: ONCOLOGY | Age: 72
End: 2022-05-16

## 2022-05-16 NOTE — TELEPHONE ENCOUNTER
[address] at [facility]  [phone]    05/16/22 9:08 AM - Patient's CBC and peripheral smear results placed in envelope with outgoing mail.

## 2022-08-03 RX ORDER — HEPARIN 100 UNIT/ML
500 SYRINGE INTRAVENOUS AS NEEDED
Start: 2022-08-04

## 2022-08-03 RX ORDER — SODIUM CHLORIDE 9 MG/ML
5-250 INJECTION, SOLUTION INTRAVENOUS AS NEEDED
OUTPATIENT
Start: 2022-08-04

## 2022-08-03 RX ORDER — SODIUM CHLORIDE 0.9 % (FLUSH) 0.9 %
5-40 SYRINGE (ML) INJECTION AS NEEDED
OUTPATIENT
Start: 2022-08-04

## 2022-08-03 RX ORDER — SODIUM CHLORIDE 9 MG/ML
5-40 INJECTION INTRAMUSCULAR; INTRAVENOUS; SUBCUTANEOUS AS NEEDED
OUTPATIENT
Start: 2022-08-04

## 2022-08-04 ENCOUNTER — HOSPITAL ENCOUNTER (OUTPATIENT)
Dept: INFUSION THERAPY | Age: 72
Discharge: HOME | End: 2022-08-04
Payer: MEDICARE

## 2022-08-04 DIAGNOSIS — D75.89 MACROCYTOSIS WITHOUT ANEMIA: Primary | ICD-10-CM

## 2022-08-04 DIAGNOSIS — R79.9 ABNORMAL BLOOD SMEAR: ICD-10-CM

## 2022-08-04 LAB
BASOPHILS # BLD: 0.1 K/UL (ref 0–0.1)
BASOPHILS NFR BLD: 2 % (ref 0–1)
DIFFERENTIAL METHOD BLD: ABNORMAL
EOSINOPHIL # BLD: 0.1 K/UL (ref 0–0.4)
EOSINOPHIL NFR BLD: 4 % (ref 0–7)
ERYTHROCYTE [DISTWIDTH] IN BLOOD BY AUTOMATED COUNT: 11.6 % (ref 11.5–14.5)
HCT VFR BLD AUTO: 38.2 % (ref 36.6–50.3)
HGB BLD-MCNC: 12.9 G/DL (ref 12.1–17)
IMM GRANULOCYTES # BLD AUTO: 0 K/UL (ref 0–0.04)
IMM GRANULOCYTES NFR BLD AUTO: 0 % (ref 0–0.5)
LYMPHOCYTES # BLD: 1.2 K/UL (ref 0.8–3.5)
LYMPHOCYTES NFR BLD: 37 % (ref 12–49)
MCH RBC QN AUTO: 33.2 PG (ref 26–34)
MCHC RBC AUTO-ENTMCNC: 33.8 G/DL (ref 30–36.5)
MCV RBC AUTO: 98.2 FL (ref 80–99)
MONOCYTES # BLD: 0.4 K/UL (ref 0–1)
MONOCYTES NFR BLD: 12 % (ref 5–13)
NEUTS SEG # BLD: 1.5 K/UL (ref 1.8–8)
NEUTS SEG NFR BLD: 45 % (ref 32–75)
NRBC # BLD: 0 K/UL (ref 0–0.01)
NRBC BLD-RTO: 0 PER 100 WBC
PLATELET # BLD AUTO: 142 K/UL (ref 150–400)
PMV BLD AUTO: 11.4 FL (ref 8.9–12.9)
RBC # BLD AUTO: 3.89 M/UL (ref 4.1–5.7)
WBC # BLD AUTO: 3.2 K/UL (ref 4.1–11.1)

## 2022-08-04 PROCEDURE — 36415 COLL VENOUS BLD VENIPUNCTURE: CPT

## 2022-08-04 PROCEDURE — 85025 COMPLETE CBC W/AUTO DIFF WBC: CPT

## 2022-08-05 ENCOUNTER — TELEPHONE (OUTPATIENT)
Dept: ONCOLOGY | Age: 72
End: 2022-08-05

## 2022-08-05 NOTE — TELEPHONE ENCOUNTER
[address] at [place]  [phone]    08/05/22 1:33 PM - Copy of patient's CBC results have been placed in the outgoing mail. Copy of results have also been faxed to patient's [facility] office at fax #[phone]. Attempted to call pt's mother back, unable to reach her due to busy signal or call not going through.

## 2022-08-05 NOTE — PROGRESS NOTES
[address] at [facility]  [phone]    08/05/22 1:17 PM - Called and spoke with the patient. Patient's ID verified x 2. Advised patient of Dr. Odalys Han result note. Patient inquired about blasts being seen. Advised patient Dr. Jose Sanchez did not mention this. Reiterated result note to the patient. The patient voiced understanding and gratitude for the call. Advised this nurse will mail him a copy of the results and fax a copy to his PCP's office. No further questions or concerns.

## 2022-10-27 ENCOUNTER — TELEPHONE (OUTPATIENT)
Dept: ONCOLOGY | Age: 72
End: 2022-10-27

## 2022-10-27 NOTE — TELEPHONE ENCOUNTER
Called patient advised we will order labs to be completed before his appointment on 11/10/22. Can you please order labs for this patient. I will call him again after they are ordered.      Thank you  Omar Osullivan

## 2022-10-27 NOTE — TELEPHONE ENCOUNTER
Patient called and stated that he was wondering why does he get his blood work done after his visits and not before so his appointment so him and Dr. Danny Del Rosario can go over his results during his appointment.     # [phone]

## 2022-11-02 DIAGNOSIS — D53.9 MACROCYTIC ANEMIA: Primary | ICD-10-CM

## 2022-11-02 DIAGNOSIS — D72.818 OTHER DECREASED WHITE BLOOD CELL COUNT: ICD-10-CM

## 2022-11-04 LAB
BASOPHILS # BLD AUTO: 0.1 X10E3/UL (ref 0–0.2)
BASOPHILS NFR BLD AUTO: 1 %
EOSINOPHIL # BLD AUTO: 0.1 X10E3/UL (ref 0–0.4)
EOSINOPHIL NFR BLD AUTO: 3 %
ERYTHROCYTE [DISTWIDTH] IN BLOOD BY AUTOMATED COUNT: 11.7 % (ref 11.6–15.4)
HCT VFR BLD AUTO: 38.4 % (ref 37.5–51)
HGB BLD-MCNC: 13 G/DL (ref 13–17.7)
IMM GRANULOCYTES # BLD AUTO: 0 X10E3/UL (ref 0–0.1)
IMM GRANULOCYTES NFR BLD AUTO: 0 %
KAPPA LC FREE SER-MCNC: 18.2 MG/L (ref 3.3–19.4)
KAPPA LC FREE/LAMBDA FREE SER: 1.98 {RATIO} (ref 0.26–1.65)
LAMBDA LC FREE SERPL-MCNC: 9.2 MG/L (ref 5.7–26.3)
LYMPHOCYTES # BLD AUTO: 1.4 X10E3/UL (ref 0.7–3.1)
LYMPHOCYTES NFR BLD AUTO: 40 %
MCH RBC QN AUTO: 33.5 PG (ref 26.6–33)
MCHC RBC AUTO-ENTMCNC: 33.9 G/DL (ref 31.5–35.7)
MCV RBC AUTO: 99 FL (ref 79–97)
MONOCYTES # BLD AUTO: 0.4 X10E3/UL (ref 0.1–0.9)
MONOCYTES NFR BLD AUTO: 11 %
NEUTROPHILS # BLD AUTO: 1.6 X10E3/UL (ref 1.4–7)
NEUTROPHILS NFR BLD AUTO: 45 %
PLATELET # BLD AUTO: 144 X10E3/UL (ref 150–450)
RBC # BLD AUTO: 3.88 X10E6/UL (ref 4.14–5.8)
WBC # BLD AUTO: 3.5 X10E3/UL (ref 3.4–10.8)

## 2022-11-10 ENCOUNTER — OFFICE VISIT (OUTPATIENT)
Dept: ONCOLOGY | Age: 72
End: 2022-11-10
Payer: MEDICARE

## 2022-11-10 VITALS
HEART RATE: 64 BPM | TEMPERATURE: 97.5 F | RESPIRATION RATE: 16 BRPM | OXYGEN SATURATION: 100 % | SYSTOLIC BLOOD PRESSURE: 125 MMHG | DIASTOLIC BLOOD PRESSURE: 74 MMHG

## 2022-11-10 DIAGNOSIS — D75.89 MACROCYTOSIS WITHOUT ANEMIA: Primary | ICD-10-CM

## 2022-11-10 DIAGNOSIS — Z86.19 HISTORY OF HEPATITIS C: ICD-10-CM

## 2022-11-10 PROCEDURE — 1101F PT FALLS ASSESS-DOCD LE1/YR: CPT | Performed by: INTERNAL MEDICINE

## 2022-11-10 PROCEDURE — 3017F COLORECTAL CA SCREEN DOC REV: CPT | Performed by: INTERNAL MEDICINE

## 2022-11-10 PROCEDURE — G8420 CALC BMI NORM PARAMETERS: HCPCS | Performed by: INTERNAL MEDICINE

## 2022-11-10 PROCEDURE — G0463 HOSPITAL OUTPT CLINIC VISIT: HCPCS | Performed by: INTERNAL MEDICINE

## 2022-11-10 PROCEDURE — G8427 DOCREV CUR MEDS BY ELIG CLIN: HCPCS | Performed by: INTERNAL MEDICINE

## 2022-11-10 PROCEDURE — 99214 OFFICE O/P EST MOD 30 MIN: CPT | Performed by: INTERNAL MEDICINE

## 2022-11-10 PROCEDURE — G8536 NO DOC ELDER MAL SCRN: HCPCS | Performed by: INTERNAL MEDICINE

## 2022-11-10 PROCEDURE — 1123F ACP DISCUSS/DSCN MKR DOCD: CPT | Performed by: INTERNAL MEDICINE

## 2022-11-10 PROCEDURE — G8510 SCR DEP NEG, NO PLAN REQD: HCPCS | Performed by: INTERNAL MEDICINE

## 2022-11-10 NOTE — PROGRESS NOTES
Rm    Chief Complaint   Patient presents with    Follow-up        Visit Vitals  /74 (BP 1 Location: Right upper arm, BP Patient Position: Sitting, BP Cuff Size: Adult)   Pulse 64   Temp 97.5 °F (36.4 °C) (Oral)   Resp 16   SpO2 100%        1. Have you been to the ER, urgent care clinic since your last visit? Hospitalized since your last visit? No    2. Have you seen or consulted any other health care providers outside of the [address] [place] [address] since your last visit? Include any pap smears or colon screening. No     Health Maintenance Due   Topic Date Due    Pneumococcal 65+ years (1 - PCV) Never done    Shingrix Vaccine Age 50> (1 of 2) Never done    DTaP/Tdap/Td series (1 - Tdap) Never done    Lipid Screen  Never done    Colorectal Cancer Screening Combo  Never done    Medicare Yearly Exam  Never done    COVID-19 Vaccine (3 - Pfizer risk series) 04/21/2021    Flu Vaccine (1) 08/01/2022        3 most recent PHQ Screens 11/10/2022   Little interest or pleasure in doing things Not at all   Feeling down, depressed, irritable, or hopeless Not at all   Total Score PHQ 2 0        Fall Risk Assessment, last 12 mths 11/10/2022   Able to walk? Yes   Fall in past 12 months? 0   Do you feel unsteady? 0   Are you worried about falling 0   Is TUG test greater than 12 seconds? -   Is the gait abnormal? -   Number of falls in past 12 months -   Fall with injury? -       No flowsheet data found.

## 2022-11-10 NOTE — PROGRESS NOTES
[facility] [place] at [address], [facility] [facility]: [phone]  F: [phone] Patient ID  Name: Kristy Jimenez  YOB: 1950  MRN: [id number]  Referring Provider:   No referring provider defined for this encounter. Primary Care Provider:   Keo Puga DO       HEMATOLOGY/MEDICAL ONCOLOGY  NOTE   Date of Visit: 11/10/22  Reason for Evaluation:     Chief Complaint   Patient presents with    Follow-up     Subjective:     History of Present Illness:     Kristy Jimenez is a 81 y.o. M who presents for a follow-up evaluation for thrombocytopenia,mild neutropenia. Patient overall reports feeling improved. He reports feeling good and healthy     Fatigue:Grade 1  Insomnia:Grade 1  Pain: 1 of 10 pain in back and knees  Rapid Heartbeat:Grade 1  Urinary Frequency/Urgency:Grade 1        Past Medical History:   Diagnosis Date    Exposure to hepatitis C     Reportedly seen by [facility] (Dr. Ana Morton); reportedly never tested positive but had antibody positiviity. Had a blood exposure [place] [employer].     Kidney disease     Osteopenia     Prostate cancer [facility])       Past Surgical History:   Procedure Laterality Date    HX CYSTECTOMY      HX OTHER SURGICAL  1964    pyeloplasty    HX PROSTATECTOMY  2004    HX TONSILLECTOMY  1953      Social History     Tobacco Use    Smoking status: Never    Smokeless tobacco: Never   Substance Use Topics    Alcohol use: Not Currently      Family History   Problem Relation Age of Onset    Cancer Father         lung cancer    Cancer Paternal Uncle         intestinal cancer    Diabetes Paternal Uncle     Cancer Paternal Grandmother         throat cancer    Cancer Paternal Grandfather         intestinal cancer     Current Outpatient Medications   Medication Sig    enalapril (VASOTEC) 20 mg tablet TAKE 1 TABLET BY MOUTH TWICE A DAY    doxycycline (VIBRAMYCIN) 50 mg capsule     hydrocortisone (HYTONE) 2.5 % topical cream APPLY TO FACE ON RASH EVERY DAY    multivitamin (ONE A DAY) tablet Take 1 Tablet by mouth daily. cholecalciferol (VITAMIN D3) 25 mcg (1,000 unit) cap Take  by mouth daily. psyllium (METAMUCIL) packet Take 1 Packet by mouth daily. cinnamon bark 500 mg cap Take 2,000 mg by mouth three (3) times daily. chlorthalidone (HYGROTON) 25 mg tablet TAKE 1 TABLET BY MOUTH EVERY DAY (Patient not taking: Reported on 11/10/2022)    aspirin delayed-release 81 mg tablet Take  by mouth daily. (Patient not taking: No sig reported)     No current facility-administered medications for this visit. Allergies   Allergen Reactions    Other Food Other (comments)     Topical antibiotics - blistering of skin    Other Plant, Animal, Environmental Sneezing     Runny nose, watery eyes     Review of Systems Provided by:  Patient  Review of Systems: A complete review of systems was obtained, reviewed. Pertinent findings reviewed above. Objective:     Visit Vitals  /74 (BP 1 Location: Right upper arm, BP Patient Position: Sitting, BP Cuff Size: Adult)   Pulse 64   Temp 97.5 °F (36.4 °C) (Oral)   Resp 16   SpO2 100%     ECOG PS: 0- Fully active, able to carry on all pre-disease performance without restriction. Physical Exam  Constitutional: No acute distress. and Non-toxic appearance. Appears with fit stature and appearance. HENT: Normocephalic and atraumatic head. and Wearing a mask during COVID-19 precautions. Eyes: Normal Conjunctivae. Anicteric sclerae. Cardiovascular: S1,S2 auscultated. No pitting edema. Pulmonary: Normal Respiratory Effort. No wheezing. No rhonchi. No rales. Abdominal: Normal bowel sounds. Soft Abdomen to palpation. Skin: No jaundice. No rash. No petechiae. Musculoskeletal: No muscle pain on palpation. No temporal muscle wasting on inspection. Lymph: No cervical or supraclavicular lymph node enlargement or tenderness. Neurological: Alert and oriented. No tremor on inspection.    Normal Gait.  Psychiatric: mood normal. normal speech rate. normal affect. Results:     I personally reviewed Epic EHR labs/results below ordered at last visit:  Orders Only on 11/03/2022   Component Date Value Ref Range Status    WBC 11/03/2022 3.5  3.4 - 10.8 x10E3/uL Final    RBC 11/03/2022 3.88 (A)  4.14 - 5.80 x10E6/uL Final    HGB 11/03/2022 13.0  13.0 - 17.7 g/dL Final    HCT 11/03/2022 38.4  37.5 - 51.0 % Final    MCV 11/03/2022 99 (A)  79 - 97 fL Final    MCH 11/03/2022 33.5 (A)  26.6 - 33.0 pg Final    MCHC 11/03/2022 33.9  31.5 - 35.7 g/dL Final    RDW 11/03/2022 11.7  11.6 - 15.4 % Final    PLATELET 22/66/1976 130 (A)  150 - 450 x10E3/uL Final    NEUTROPHILS 11/03/2022 45  Not Estab. % Final    Lymphocytes 11/03/2022 40  Not Estab. % Final    MONOCYTES 11/03/2022 11  Not Estab. % Final    EOSINOPHILS 11/03/2022 3  Not Estab. % Final    BASOPHILS 11/03/2022 1  Not Estab. % Final    ABS. NEUTROPHILS 11/03/2022 1.6  1.4 - 7.0 x10E3/uL Final    Abs Lymphocytes 11/03/2022 1.4  0.7 - 3.1 x10E3/uL Final    ABS. MONOCYTES 11/03/2022 0.4  0.1 - 0.9 x10E3/uL Final    ABS. EOSINOPHILS 11/03/2022 0.1  0.0 - 0.4 x10E3/uL Final    ABS. BASOPHILS 11/03/2022 0.1  0.0 - 0.2 x10E3/uL Final    IMMATURE GRANULOCYTES 11/03/2022 0  Not Estab. % Final    ABS. IMM. GRANS.  11/03/2022 0.0  0.0 - 0.1 x10E3/uL Final    Free Kappa Lt Chains, serum 11/03/2022 18.2  3.3 - 19.4 mg/L Final    Free Lambda Lt Chains, serum 11/03/2022 9.2  5.7 - 26.3 mg/L Final    Kappa/Lambda ratio, serum 11/03/2022 1.98 (A)  0.26 - 1.65 Final   Hospital Outpatient Visit on 08/04/2022   Component Date Value Ref Range Status    WBC 08/04/2022 3.2 (A)  4.1 - 11.1 K/uL Final    RBC 08/04/2022 3.89 (A)  4.10 - 5.70 M/uL Final    HGB 08/04/2022 12.9  12.1 - 17.0 g/dL Final    HCT 08/04/2022 38.2  36.6 - 50.3 % Final    MCV 08/04/2022 98.2  80.0 - 99.0 FL Final    MCH 08/04/2022 33.2  26.0 - 34.0 PG Final    MCHC 08/04/2022 33.8  30.0 - 36.5 g/dL Final    RDW 08/04/2022 11.6  11.5 - 14.5 % Final    PLATELET 91/65/6254 108 (A)  150 - 400 K/uL Final    MPV 08/04/2022 11.4  8.9 - 12.9 FL Final    NRBC 08/04/2022 0.0  0  WBC Final    ABSOLUTE NRBC 08/04/2022 0.00  0.00 - 0.01 K/uL Final    NEUTROPHILS 08/04/2022 45  32 - 75 % Final    LYMPHOCYTES 08/04/2022 37  12 - 49 % Final    MONOCYTES 08/04/2022 12  5 - 13 % Final    EOSINOPHILS 08/04/2022 4  0 - 7 % Final    BASOPHILS 08/04/2022 2 (A)  0 - 1 % Final    IMMATURE GRANULOCYTES 08/04/2022 0  0.0 - 0.5 % Final    ABS. NEUTROPHILS 08/04/2022 1.5 (A)  1.8 - 8.0 K/UL Final    ABS. LYMPHOCYTES 08/04/2022 1.2  0.8 - 3.5 K/UL Final    ABS. MONOCYTES 08/04/2022 0.4  0.0 - 1.0 K/UL Final    ABS. EOSINOPHILS 08/04/2022 0.1  0.0 - 0.4 K/UL Final    ABS. BASOPHILS 08/04/2022 0.1  0.0 - 0.1 K/UL Final    ABS. IMM. GRANS. 08/04/2022 0.0  0.00 - 0.04 K/UL Final    DF 08/04/2022 AUTOMATED    Final   * * *FINAL PATHOLOGIC DIAGNOSIS* * *        Blood, smear:        Normochromic macrocytic erythrocytes. There is no significant   anisocytosis and there is no polychromasia or nucleated red blood cells. Schistocytes are not identified. Neutrophils are normal in number with few   smaller hypolobated hypogranular forms. Hypolobated eosinophils are also   noted. Morphologically unremarkable monocytes and lymphocytes are normal   in number. Platelets unremarkable. [facility]7/5/18/3631   *Electronically Signed Out By [place]. Mary Anne Maldonado MD*       Assessment and Recommendations:     1. Macrocytosis without anemia  -Lab in 6 months and MD in 12 months.  - CBC WITH AUTOMATED DIFF; Future    2. History of hepatitis C  -reports never tested positive for HCV PCR but unclear that we will get the records. - US ABD LTD; Future  - HEPATITIS C QT BY PCR WITH REFLEX GENOTYPE; Future  - HEPATITIS C AB; Future        Follow-up and Dispositions    Return in about 1 year (around 11/10/2023).        MD visit in 12 months    Salvador Loera MD  Hematology/Medical Oncology Provider  [facility]  P: [phone]      Signed By:   Lillian Santacruz MD

## 2023-05-03 NOTE — PROGRESS NOTES
Please let patient know that labs are basically stable. He can keep November appt.   Thanks, Dr. Erika Morley Well-controlled  Continue Lisinopril 20mg daily

## 2023-05-09 LAB
BASOPHILS # BLD: 0.1 K/UL (ref 0–0.1)
BASOPHILS NFR BLD: 2 % (ref 0–1)
DIFFERENTIAL METHOD BLD: ABNORMAL
EOSINOPHIL # BLD: 0.1 K/UL (ref 0–0.4)
EOSINOPHIL NFR BLD: 3 % (ref 0–7)
ERYTHROCYTE [DISTWIDTH] IN BLOOD BY AUTOMATED COUNT: 11.7 % (ref 11.5–14.5)
HCT VFR BLD AUTO: 39.2 % (ref 36.6–50.3)
HGB BLD-MCNC: 12.4 G/DL (ref 12.1–17)
IMM GRANULOCYTES # BLD AUTO: 0 K/UL (ref 0–0.04)
IMM GRANULOCYTES NFR BLD AUTO: 0 % (ref 0–0.5)
LYMPHOCYTES # BLD: 1.3 K/UL (ref 0.8–3.5)
LYMPHOCYTES NFR BLD: 34 % (ref 12–49)
MCH RBC QN AUTO: 32.5 PG (ref 26–34)
MCHC RBC AUTO-ENTMCNC: 31.6 G/DL (ref 30–36.5)
MCV RBC AUTO: 102.9 FL (ref 80–99)
MONOCYTES # BLD: 0.4 K/UL (ref 0–1)
MONOCYTES NFR BLD: 10 % (ref 5–13)
NEUTS SEG # BLD: 2 K/UL (ref 1.8–8)
NEUTS SEG NFR BLD: 51 % (ref 32–75)
NRBC # BLD: 0 K/UL (ref 0–0.01)
NRBC BLD-RTO: 0 PER 100 WBC
PLATELET # BLD AUTO: 156 K/UL (ref 150–400)
PMV BLD AUTO: 12.8 FL (ref 8.9–12.9)
RBC # BLD AUTO: 3.81 M/UL (ref 4.1–5.7)
WBC # BLD AUTO: 3.8 K/UL (ref 4.1–11.1)

## 2023-10-31 ENCOUNTER — TELEPHONE (OUTPATIENT)
Age: 73
End: 2023-10-31

## 2023-10-31 DIAGNOSIS — D72.818 OTHER DECREASED WHITE BLOOD CELL COUNT: Primary | ICD-10-CM

## 2023-10-31 NOTE — TELEPHONE ENCOUNTER
Pt would like to get his labs done before his appt next week.  No orders in the system    CB# [phone]

## 2023-10-31 NOTE — TELEPHONE ENCOUNTER
Harsh Romain at [facility]  [phone]    10/31/23 10:02 AM EDT - Attempted to reach the patient to advise his lab orders have been placed. There was no answer. Left a voicemail for the patient to call back at their earliest convenience along with the phone number to our office. Harsh Romain at [facility]  [phone]    10/31/23 12:50 PM EDT - Received call from patient. Cr Diaz, advised patient lab orders have been placed. No further questions or concerns.

## 2023-11-06 DIAGNOSIS — D72.818 OTHER DECREASED WHITE BLOOD CELL COUNT: ICD-10-CM

## 2023-11-06 LAB
BASOPHILS # BLD: 0.1 K/UL (ref 0–0.1)
BASOPHILS NFR BLD: 2 % (ref 0–1)
DIFFERENTIAL METHOD BLD: ABNORMAL
EOSINOPHIL # BLD: 0.1 K/UL (ref 0–0.4)
EOSINOPHIL NFR BLD: 3 % (ref 0–7)
ERYTHROCYTE [DISTWIDTH] IN BLOOD BY AUTOMATED COUNT: 11.5 % (ref 11.5–14.5)
HCT VFR BLD AUTO: 37.2 % (ref 36.6–50.3)
HGB BLD-MCNC: 12.4 G/DL (ref 12.1–17)
IMM GRANULOCYTES # BLD AUTO: 0 K/UL (ref 0–0.04)
IMM GRANULOCYTES NFR BLD AUTO: 0 % (ref 0–0.5)
LYMPHOCYTES # BLD: 1.3 K/UL (ref 0.8–3.5)
LYMPHOCYTES NFR BLD: 38 % (ref 12–49)
MCH RBC QN AUTO: 32.6 PG (ref 26–34)
MCHC RBC AUTO-ENTMCNC: 33.3 G/DL (ref 30–36.5)
MCV RBC AUTO: 97.9 FL (ref 80–99)
MONOCYTES # BLD: 0.4 K/UL (ref 0–1)
MONOCYTES NFR BLD: 11 % (ref 5–13)
NEUTS SEG # BLD: 1.6 K/UL (ref 1.8–8)
NEUTS SEG NFR BLD: 46 % (ref 32–75)
NRBC # BLD: 0 K/UL (ref 0–0.01)
NRBC BLD-RTO: 0 PER 100 WBC
PLATELET # BLD AUTO: 161 K/UL (ref 150–400)
PMV BLD AUTO: 12.3 FL (ref 8.9–12.9)
RBC # BLD AUTO: 3.8 M/UL (ref 4.1–5.7)
WBC # BLD AUTO: 3.4 K/UL (ref 4.1–11.1)

## 2023-11-09 ENCOUNTER — OFFICE VISIT (OUTPATIENT)
Age: 73
End: 2023-11-09
Payer: MEDICARE

## 2023-11-09 VITALS
SYSTOLIC BLOOD PRESSURE: 116 MMHG | TEMPERATURE: 97.8 F | BODY MASS INDEX: 21.76 KG/M2 | HEART RATE: 48 BPM | HEIGHT: 70 IN | RESPIRATION RATE: 16 BRPM | DIASTOLIC BLOOD PRESSURE: 71 MMHG | OXYGEN SATURATION: 99 % | WEIGHT: 152 LBS

## 2023-11-09 DIAGNOSIS — D72.818 OTHER DECREASED WHITE BLOOD CELL COUNT: Primary | ICD-10-CM

## 2023-11-09 PROCEDURE — 99212 OFFICE O/P EST SF 10 MIN: CPT | Performed by: INTERNAL MEDICINE

## 2023-11-09 PROCEDURE — G8484 FLU IMMUNIZE NO ADMIN: HCPCS | Performed by: INTERNAL MEDICINE

## 2023-11-09 PROCEDURE — 1036F TOBACCO NON-USER: CPT | Performed by: INTERNAL MEDICINE

## 2023-11-09 PROCEDURE — G8420 CALC BMI NORM PARAMETERS: HCPCS | Performed by: INTERNAL MEDICINE

## 2023-11-09 PROCEDURE — G8427 DOCREV CUR MEDS BY ELIG CLIN: HCPCS | Performed by: INTERNAL MEDICINE

## 2023-11-09 PROCEDURE — 3017F COLORECTAL CA SCREEN DOC REV: CPT | Performed by: INTERNAL MEDICINE

## 2023-11-09 PROCEDURE — 1123F ACP DISCUSS/DSCN MKR DOCD: CPT | Performed by: INTERNAL MEDICINE

## 2023-11-09 ASSESSMENT — PATIENT HEALTH QUESTIONNAIRE - PHQ9
SUM OF ALL RESPONSES TO PHQ QUESTIONS 1-9: 0
2. FEELING DOWN, DEPRESSED OR HOPELESS: 0
SUM OF ALL RESPONSES TO PHQ QUESTIONS 1-9: 0
1. LITTLE INTEREST OR PLEASURE IN DOING THINGS: 0
SUM OF ALL RESPONSES TO PHQ9 QUESTIONS 1 & 2: 0

## 2023-11-09 NOTE — PROGRESS NOTES
[facility] [place] at [address], [address] [place] [address]  Parul Like: [phone]  F: [phone] Patient ID  Name: Anthony Corbett  YOB: 1950  MRN: [id number]  Referring Provider:   No referring provider defined for this encounter. Primary Care Provider:   Massiel Hein DO       HEMATOLOGY/MEDICAL ONCOLOGY  NOTE     Reason for Evaluation:     Chief Complaint   Patient presents with    Follow-up       Subjective:     History of Present Illness:   Date of Visit: 11/09/23   Anthony Corbett is a 72 y.o. male who presents for a follow-up evaluation for neutropenia. He reports feeling well overall. Fatigue:Grade 1  Insomnia:Grade 1  Pain:Back,knees 1 of 10 pain. Rapid Heartbeat: Grade 1  Numbness/Tingling:Grade 1  Urinary Frequency/Urgency:Grade 1 Additional Comments: recurrent prostate cancer; may need probably pacemaker     Patient overall reports feeling stable. Reports that he wants to take additional iron supplementation. Eats lentil beans and already takes  a multivitamin. Current Outpatient Medications   Medication Instructions    aspirin 81 MG EC tablet DAILY    chlorthalidone (HYGROTON) 25 MG tablet TAKE 1 TABLET BY MOUTH EVERY DAY    Cinnamon 500 MG CAPS Oral, 3 TIMES DAILY    doxycycline (VIBRAMYCIN) 50 MG capsule ceived the following from [facility]: Outside name: doxycycline (VIBRAMYCIN) 50 mg capsule    enalapril (VASOTEC) 20 MG tablet TAKE 1 TABLET BY MOUTH TWICE A DAY    Ferrous Sulfate (IRON PO) Oral    hydrocortisone 2.5 % cream APPLY TO FACE ON RASH EVERY DAY    vitamin D 25 MCG (1000 UT) CAPS Oral, DAILY     Allergies   Allergen Reactions    Other Other (See Comments)     Topical antibiotics - blistering of skin       Review of Systems Provided by:  Patient  Review of Systems: A complete review of systems was obtained, reviewed. Pertinent findings reviewed above.   Past medical history, social history, and family history

## 2023-11-12 PROBLEM — D72.818 OTHER DECREASED WHITE BLOOD CELL COUNT: Status: ACTIVE | Noted: 2023-11-12

## 2023-12-26 ENCOUNTER — HOSPITAL ENCOUNTER (OUTPATIENT)
Facility: HOSPITAL | Age: 73
Discharge: HOME | End: 2023-12-29

## 2023-12-26 VITALS
WEIGHT: 148 LBS | BODY MASS INDEX: 21.19 KG/M2 | DIASTOLIC BLOOD PRESSURE: 70 MMHG | HEIGHT: 70 IN | HEART RATE: 49 BPM | SYSTOLIC BLOOD PRESSURE: 120 MMHG

## 2024-01-24 RX ORDER — ACETAMINOPHEN 160 MG
2000 TABLET,DISINTEGRATING ORAL DAILY
COMMUNITY

## 2024-01-24 NOTE — FLOWSHEET NOTE
[facility]  Endoscopy Preprocedure Instructions      1. On the day of your surgery, please report to registration located on the 2nd floor of the  the Main Hospital. yes    2. You must have a responsible adult to drive you to the hospital, stay at the hospital during your procedure and drive you home. If they leave your procedure will not be started (no exceptions). yes    3. Do not have anything to eat or drink (including water, gum, mints, coffee, and juice) after midnight. This does not apply to the medications you were instructed to take by your physician.yes  If you are currently taking Plavix, Coumadin, Aspirin, or other blood-thinning agents, contact your physician for special instructions. yes,aspirin    4. If you are having a procedure that requires bowel prep: We recommend that you have only clear liquids the day before your procedure and begin your bowel prep by 5:00 pm.  You may continue to drink clear liquids until midnight.  If for any reason you are not able to complete your prep please notify your physician immediately. yes    5. Have a list of all current medications, including vitamins, herbal supplements and any other over the counter medications. Reviewed over the phone    6. If you wear glasses, contacts, dentures and/or hearing aids, they may be removed prior to procedure, please bring a case to store them in. yes    7. You should understand that if you do not follow these instructions your procedure may be cancelled.  If your physical condition changes (I.e. fever, cold or flu) please contact your doctor as soon as possible.    8. It is important that you be on time.  If for any reason you are unable to keep your appointment please call [phone] the day of or your physician’s office prior to your scheduled procedure    9. Have you received your COVID Vaccine? yes If no, you will need to receive a COVID test/swab here at [facility] [place] 8a -

## 2024-01-25 ENCOUNTER — HOSPITAL ENCOUNTER (OUTPATIENT)
Facility: HOSPITAL | Age: 74
Setting detail: OUTPATIENT SURGERY
Discharge: HOME | End: 2024-01-25
Attending: INTERNAL MEDICINE | Admitting: INTERNAL MEDICINE
Payer: MEDICARE

## 2024-01-25 ENCOUNTER — ANESTHESIA EVENT (OUTPATIENT)
Facility: HOSPITAL | Age: 74
End: 2024-01-25
Payer: MEDICARE

## 2024-01-25 ENCOUNTER — ANESTHESIA (OUTPATIENT)
Facility: HOSPITAL | Age: 74
End: 2024-01-25
Payer: MEDICARE

## 2024-01-25 VITALS
RESPIRATION RATE: 15 BRPM | DIASTOLIC BLOOD PRESSURE: 60 MMHG | SYSTOLIC BLOOD PRESSURE: 115 MMHG | BODY MASS INDEX: 21.24 KG/M2 | TEMPERATURE: 97.5 F | HEIGHT: 70 IN | HEART RATE: 60 BPM | WEIGHT: 148.37 LBS | OXYGEN SATURATION: 100 %

## 2024-01-25 PROCEDURE — 3600007512: Performed by: INTERNAL MEDICINE

## 2024-01-25 PROCEDURE — 7100000011 HC PHASE II RECOVERY - ADDTL 15 MIN: Performed by: INTERNAL MEDICINE

## 2024-01-25 PROCEDURE — 93005 ELECTROCARDIOGRAM TRACING: CPT | Performed by: STUDENT IN AN ORGANIZED HEALTH CARE EDUCATION/TRAINING PROGRAM

## 2024-01-25 PROCEDURE — 7100000010 HC PHASE II RECOVERY - FIRST 15 MIN: Performed by: INTERNAL MEDICINE

## 2024-01-25 PROCEDURE — 88305 TISSUE EXAM BY PATHOLOGIST: CPT

## 2024-01-25 PROCEDURE — 6360000002 HC RX W HCPCS: Performed by: NURSE ANESTHETIST, CERTIFIED REGISTERED

## 2024-01-25 PROCEDURE — 3600007502: Performed by: INTERNAL MEDICINE

## 2024-01-25 PROCEDURE — 3700000000 HC ANESTHESIA ATTENDED CARE: Performed by: INTERNAL MEDICINE

## 2024-01-25 PROCEDURE — 3700000001 HC ADD 15 MINUTES (ANESTHESIA): Performed by: INTERNAL MEDICINE

## 2024-01-25 PROCEDURE — 2500000003 HC RX 250 WO HCPCS: Performed by: NURSE ANESTHETIST, CERTIFIED REGISTERED

## 2024-01-25 RX ORDER — EPHEDRINE SULFATE/0.9% NACL/PF 50 MG/5 ML
SYRINGE (ML) INTRAVENOUS PRN
Status: DISCONTINUED | OUTPATIENT
Start: 2024-01-25 | End: 2024-01-25 | Stop reason: SDUPTHER

## 2024-01-25 RX ORDER — SODIUM CHLORIDE 9 MG/ML
INJECTION, SOLUTION INTRAVENOUS CONTINUOUS
Status: DISCONTINUED | OUTPATIENT
Start: 2024-01-25 | End: 2024-01-25 | Stop reason: HOSPADM

## 2024-01-25 RX ORDER — GLYCOPYRROLATE 0.2 MG/ML
INJECTION INTRAMUSCULAR; INTRAVENOUS PRN
Status: DISCONTINUED | OUTPATIENT
Start: 2024-01-25 | End: 2024-01-25 | Stop reason: SDUPTHER

## 2024-01-25 RX ORDER — PROPOFOL 10 MG/ML
INJECTION, EMULSION INTRAVENOUS PRN
Status: DISCONTINUED | OUTPATIENT
Start: 2024-01-25 | End: 2024-01-25 | Stop reason: SDUPTHER

## 2024-01-25 RX ADMIN — Medication 10 MG: at 10:37

## 2024-01-25 RX ADMIN — PROPOFOL 130 MCG/KG/MIN: 10 INJECTION, EMULSION INTRAVENOUS at 10:19

## 2024-01-25 RX ADMIN — GLYCOPYRROLATE 0.2 MG: 0.2 INJECTION INTRAMUSCULAR; INTRAVENOUS at 10:15

## 2024-01-25 RX ADMIN — PROPOFOL 100 MG: 10 INJECTION, EMULSION INTRAVENOUS at 10:18

## 2024-01-25 ASSESSMENT — PAIN - FUNCTIONAL ASSESSMENT: PAIN_FUNCTIONAL_ASSESSMENT: 0-10

## 2024-01-25 NOTE — OP NOTE
[facility]  Doyle Louie M.D.  [phone]            2024          Colonoscopy Operative Report  Wayne Rose  :  1950  [facility] Medical Record Number:  [id number]      Indications:    Screening colonoscopy     :  Doyle Louie MD    Referring Provider: Grover Bates MD    Sedation:  MAC anesthesia    Pre-Procedural Exam:      Airway: clear,  No airway problems anticipated  Heart: RRR, without gallops or rubs  Lungs: clear bilaterally without wheezes, crackles, or rhonchi  Abdomen: soft, nontender, nondistended, bowel sounds present  Mental Status: awake, alert and oriented to person, place and time     Procedure Details:  After informed consent was obtained with all risks and benefits of procedure explained and preoperative exam completed, the patient was taken to the endoscopy suite and placed in the left lateral decubitus position.  Upon sequential sedation as per above, a digital rectal exam was performed. The Olympus videocolonoscope  was inserted in the rectum and carefully advanced to the cecum, which was identified by the ileocecal valve and appendiceal orifice.  The quality of preparation was good.  The colonoscope was slowly withdrawn with careful inspection and evaluation between folds. Retroflexion in the rectum was performed.    Findings:   Terminal Ileum: not intubated  Cecum: normal  Ascending Colon: 4  Sessile polyp(s), the largest 3 mm in size;  Transverse Colon: normal  Descending Colon: normal  Sigmoid: normal mucosa, mildly tortuous lumen  Rectum: no mucosal lesion appreciated  Grade 1 internal hemorrhoid(s);    Interventions:  4 complete polypectomy were performed using cold snare  and the polyps were  retrieved    Specimen Removed:  specimen #1, 3 mm in size, located in the ascending colon removed by cold snare and retrieved for pathology    Complications: None.     EBL:  None.    Impression:  Four diminutive polyps removed and sent to

## 2024-01-25 NOTE — ANESTHESIA PRE PROCEDURE
Department of Anesthesiology  Preprocedure Note       Name:  aWyne Rose   Age:  73 y.o.  :  1950                                          MRN:  [id number]         Date:  2024      Surgeon: Surgeon(s):  Doyle Louie MD    Procedure: Procedure(s):  COLONOSCOPY POLYPECTOMY SNARE/COLD BIOPSY    Medications prior to admission:   Prior to Admission medications    Medication Sig Start Date End Date Taking? Authorizing Provider   UNABLE TO FIND Hydrocortisone cream as needed around the eyes. Not sure of strength.   Yes Jayne Galindo MD   CINNAMON PO Take 4,000 mg by mouth daily   Yes Jayne Galindo MD   Cholecalciferol (VITAMIN D3) 50 MCG (2000 UT) CAPS Take 1 capsule by mouth daily   Yes Jayne Galindo MD   Calcium Carbonate Antacid (TUMS PO) Take by mouth Takes one po daily.   Yes Jayne Galindo MD   Multiple Vitamin (MULTIVITAMIN ADULT PO) Take by mouth For over 60. Takes 2 po once daily.    Jayne Galindo MD   METAMUCIL FIBER PO Take 1 Dose by mouth in the morning, at noon, and at bedtime powder    Jayne Galindo MD   aspirin 81 MG EC tablet Take by mouth Takes one po occasionally.    Automatic Reconciliation, Ar   vitamin D 25 MCG (1000 UT) CAPS Take 1 capsule by mouth daily    Automatic Reconciliation, Ar   doxycycline (VIBRAMYCIN) 50 MG capsule Take 1 capsule by mouth daily ceived the following from [facility]: Outside name: doxycycline (VIBRAMYCIN) 50 mg capsule 7/10/21   Automatic Reconciliation, Ar   enalapril (VASOTEC) 20 MG tablet TAKE 1 TABLET BY MOUTH TWICE A DAY 21   Automatic Reconciliation, Ar       Current medications:    Current Facility-Administered Medications   Medication Dose Route Frequency Provider Last Rate Last Admin   • 0.9 % sodium chloride infusion   IntraVENous Continuous Doyle Louie MD           Allergies:    Allergies   Allergen Reactions   • Other Other (See Comments)     Topical antibiotics - blistering of skin

## 2024-01-25 NOTE — ANESTHESIA POSTPROCEDURE EVALUATION
Department of Anesthesiology  Postprocedure Note    Patient: Wayne Rose  MRN: [id number]  YOB: 1950  Date of evaluation: 1/25/2024    Procedure Summary       Date: 01/25/24 Room / Location: [place] [id number] / [facility] ENDOSCOPY    Anesthesia Start: 1013 Anesthesia Stop: 1049    Procedure: COLONOSCOPY POLYPECTOMY SNARE/COLD BIOPSY (Lower GI Region) Diagnosis: Colon cancer screening    Surgeons: Doyle Louie MD Responsible Provider: Joseph Machuca MD    Anesthesia Type: MAC ASA Status: 3            Anesthesia Type: MAC    Heide Phase I: Heide Score: 10    Heide Phase II: Heide Score: 10    Anesthesia Post Evaluation    Patient location during evaluation: PACU  Patient participation: complete - patient participated  Level of consciousness: awake and alert and awake  Pain score: 0  Airway patency: patent  Nausea & Vomiting: no vomiting and no nausea  Cardiovascular status: hemodynamically stable  Respiratory status: room air  Hydration status: stable  There was medical reason for not using a multimodal analgesia pain management approach.    No notable events documented.

## 2024-01-25 NOTE — H&P
[facility]  Doyle Louie M.D.  [phone]    History and Physical       NAME:  Wayne Rose   :   1950   MRN:   [id number]       Referring Physician:  Dr. Cook    Consult Date: 2024 9:57 AM    Chief Complaint:  Colon cancer screening    History of Present Illness:  Patient is a 73 y.o. who is seen for colon cancer screening. Denies any ongoing GI complaints.      PMH:  Past Medical History:   Diagnosis Date    Arthritis     hands - stiff joints - not formally diagnosed per pt    AV block     no pacemaker at this time    Cataracts, both eyes     early stage    Exposure to hepatitis C     Reportedly seen by [facility] (Dr. Ohara); reportedly never tested positive but had antibody positiviity. [facility] blood [facility].    Hypertension     Hypertriglyceridemia     in past/briefly tried prophylactic cholesterol med but cholesterol went too low and was taken off    Kidney disease     Kidney stone     passed on own    Neutropenia (HCC)     red and white blood cells decreasing but are no stabilized    Osteopenia     Prostate cancer (HCC)     and reoccurance - being evaluated now       PSH:  Past Surgical History:   Procedure Laterality Date    COLONOSCOPY      x2    HX CYSTECTOMY      OTHER SURGICAL HISTORY  1964    pyeloplasty    PROSTATECTOMY  2004    TONSILLECTOMY      and adenoidectomy    WISDOM TOOTH EXTRACTION         Allergies:  Allergies   Allergen Reactions    Other Other (See Comments)     Topical antibiotics - blistering of skin       Home Medications:  Prior to Admission Medications   Prescriptions Last Dose Informant Patient Reported? Taking?   CINNAMON PO 2024  Yes Yes   Sig: Take 4,000 mg by mouth daily   Calcium Carbonate Antacid (TUMS PO) 2024  Yes Yes   Sig: Take by mouth Takes one po daily.   Cholecalciferol (VITAMIN D3) 50 MCG ( UT) CAPS 2024  Yes Yes   Sig: Take 1 capsule by mouth daily   METAMUCIL FIBER PO 2024  Yes No

## 2024-01-25 NOTE — PROGRESS NOTES
Wayne BOWEN Ax  1950  [id number]    Situation:  Verbal report received from: JOSIE Aviles  Procedure: Procedure(s):  COLONOSCOPY POLYPECTOMY SNARE/COLD BIOPSY    Background:    Preoperative diagnosis: * No pre-op diagnosis entered *  Postoperative diagnosis: * No post-op diagnosis entered *    :  Dr. Louie  Assistant(s): Circulator: Traci Mckinney RN  Circulator Assist: Sophie Chua RN    Specimens:   ID Type Source Tests Collected by Time Destination   1 : ascending colon polyp Tissue Colon-Ascending SURGICAL PATHOLOGY Doyle Louie MD 1/25/2024 1033      H. Pylori test: no    Assessment:    Anesthesia gave intra-procedure sedation and medications, see anesthesia flow sheet     Intravenous fluids: NS@ KVO     Vital signs stable yes    Abdominal assessment: round and soft yes    Recommendation:  Discharge patient per MD order yes.    Ride home with: Jcarlos  Permission to share finding with family or friend yes        Endoscopy discharge instructions have been reviewed and given to patient.  The patient verbalized understanding and acceptance of instructions.      Dr. Louie discussed with patient procedure findings and next steps.

## 2024-01-25 NOTE — PERIOP NOTE
Anesthesia staff at patient's bedside administering anesthesia and monitoring patients vital signs throughout procedure. See anesthesia note.    Scope pre washed by ottoniel.    Pt transferred to post op and placed on bedside cardiac monitor. VSS. Report using SBAR format given to receiving RN Kayy.  Opportunity for questions provided and answered.

## 2024-01-25 NOTE — DISCHARGE INSTRUCTIONS
[facility]  Doyle Louie M.D.  [phone]            COLON DISCHARGE INSTRUCTIONS       2024    Wayne Rose  :  1950  [facility] Medical Record Number:  [id number]    COLONOSCOPY FINDINGS:  Your colonoscopy showed a total of 4 small polyps that were removed and sent to pathology, otherwise mucosa within normal. Mildly tortuous lumen noted in the sigmoid colon and small internal hemorrhoids.    DISCOMFORT:  Redness at IV site- apply warm compress to area; if redness or soreness persist- contact your physician  There may be a slight amount of blood passed from the rectum  Gaseous discomfort- walking, belching will help relieve any discomfort  You may not operate a vehicle for 12 hours  You may not engage in an occupation involving machinery or appliances for rest of today  You may not drink alcoholic beverages for at least 12 hours  Avoid making any critical decisions for at least 24 hour  DIET:   High fiber diet   - however -  remember your colon is empty and a heavy meal will produce gas.   Avoid these foods:  vegetables, fried / greasy foods, carbonated drinks for today     ACTIVITY:  You may resume your normal daily activities it is recommended that you spend the remainder of the day resting -  avoid any strenuous activity.    CALL M.D.  ANY SIGN OF:   Increasing pain, nausea, vomiting  Abdominal distension (swelling)  New increased bleeding (oral or rectal)  Fever (chills)  Pain in chest area  Bloody discharge from nose or mouth   Shortness of breath    Follow-up Instructions:   Call Dr. Louie if any questions or problems.   Telephone # [phone]    Repeat colonoscopy in 3 to 5 years based on pathology results.

## 2024-01-26 LAB
EKG ATRIAL RATE: 52 BPM
EKG DIAGNOSIS: NORMAL
EKG P AXIS: 76 DEGREES
EKG P-R INTERVAL: 472 MS
EKG Q-T INTERVAL: 430 MS
EKG QRS DURATION: 110 MS
EKG QTC CALCULATION (BAZETT): 399 MS
EKG R AXIS: -48 DEGREES
EKG T AXIS: 48 DEGREES
EKG VENTRICULAR RATE: 52 BPM

## 2024-03-08 PROBLEM — R97.21 RISING PSA FOLLOWING TREATMENT FOR MALIGNANT NEOPLASM OF PROSTATE: Status: ACTIVE | Noted: 2024-03-08

## 2024-03-08 PROBLEM — C61 PROSTATE CANCER (HCC): Status: ACTIVE | Noted: 2024-03-08

## 2024-03-12 ENCOUNTER — HOSPITAL ENCOUNTER (OUTPATIENT)
Facility: HOSPITAL | Age: 74
Discharge: HOME | End: 2024-03-15

## 2024-03-12 VITALS
DIASTOLIC BLOOD PRESSURE: 65 MMHG | WEIGHT: 148 LBS | HEIGHT: 70 IN | RESPIRATION RATE: 16 BRPM | SYSTOLIC BLOOD PRESSURE: 115 MMHG | HEART RATE: 48 BPM | BODY MASS INDEX: 21.19 KG/M2

## 2024-03-12 DIAGNOSIS — R97.21 RISING PSA FOLLOWING TREATMENT FOR MALIGNANT NEOPLASM OF PROSTATE: ICD-10-CM

## 2024-03-12 DIAGNOSIS — C61 PROSTATE CANCER (HCC): Primary | ICD-10-CM

## 2024-03-12 ASSESSMENT — PAIN SCALES - GENERAL: PAINLEVEL_OUTOF10: 0

## 2024-03-12 NOTE — PROGRESS NOTES
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  Interval History   Mr. Rose arrives today for re-eval.     On 2/27/2024 he underwent an MRI of the pelvis which showed no evidence of recurrent tumor.    He had a PSA drawn prior to today's visit which was 0.135.    Clinically, he has had no other changes.  He has also had some renal imaging and is following these findings with Dr. Frias and his nephrologist.     IPSS: 2, 2, 2, 1, 3, 3, 2 = 15/35  Quality of life: 2-mostly satisfied  Sexual health inventory for men: 1/25, not sexually active, since surgery    Original HPI 12/26/23:  He was initially found to have an elevated PSA of 4.8 in 2004 and a TRUS biopsy on 9/23/2004 showed 4/8 cores + for [place] 3+3=6 adenocarcinoma.     On 11/16/2004 he underwent a radical prostatectomy by Dr. Ochoa and was found to have pathologic T2c disease, [place] 3+3=6.  Margins were negative.  There was no extraprostatic extension, seminal vesicle invasion, or PNI.  0/2 pelvic lymph nodes were involved.     His postoperative PSAs were undetectable until 2022.  They trended as follows:     3/9/2021: Less than 0.1  3/10/2022: 0.113  9/8/2022: 0.137  2/27/2023: 0.117  8/28/2023: 0.161     On 9/11/2023 he underwent a Pylarify PSMA PET[facility].  This showed a small focal area of tracer binding within the inferior bladder neck in the patient's prostatectomy bed.  He was also noted to have an 8 cm hypodense left renal mass.     Further imaging with an MRI was recommended.  A renal ultrasound was also

## 2024-10-30 ENCOUNTER — TELEPHONE (OUTPATIENT)
Age: 74
End: 2024-10-30

## 2024-10-30 NOTE — TELEPHONE ENCOUNTER
Pt called back after receiving a vm stating he did not need any labs done before his upcoming apt.  Pt called to confirm that was correct, I told the pt the information was right.    Pt understood

## 2024-10-30 NOTE — TELEPHONE ENCOUNTER
Patient called and stated he would like to get any lab work he needs done before his appointment next week.

## 2024-11-05 ENCOUNTER — TELEPHONE (OUTPATIENT)
Age: 74
End: 2024-11-05

## 2024-11-05 NOTE — TELEPHONE ENCOUNTER
Called pt to let him know he does not need to get any lab work done prior to his follow up with .  Pt did not answer LM

## 2024-11-25 ENCOUNTER — OFFICE VISIT (OUTPATIENT)
Age: 74
End: 2024-11-25
Payer: MEDICARE

## 2024-11-25 VITALS
DIASTOLIC BLOOD PRESSURE: 66 MMHG | HEIGHT: 70 IN | BODY MASS INDEX: 21.62 KG/M2 | HEART RATE: 47 BPM | SYSTOLIC BLOOD PRESSURE: 119 MMHG | OXYGEN SATURATION: 99 % | WEIGHT: 151 LBS

## 2024-11-25 DIAGNOSIS — D69.6 THROMBOCYTOPENIA (HCC): ICD-10-CM

## 2024-11-25 DIAGNOSIS — D70.8 OTHER NEUTROPENIA (HCC): Primary | ICD-10-CM

## 2024-11-25 LAB
BASOPHILS # BLD: 0 K/UL (ref 0–0.1)
BASOPHILS NFR BLD: 1 % (ref 0–1)
DIFFERENTIAL METHOD BLD: ABNORMAL
EOSINOPHIL # BLD: 0.1 K/UL (ref 0–0.4)
EOSINOPHIL NFR BLD: 2 % (ref 0–7)
ERYTHROCYTE [DISTWIDTH] IN BLOOD BY AUTOMATED COUNT: 11.4 % (ref 11.5–14.5)
HCT VFR BLD AUTO: 40.4 % (ref 36.6–50.3)
HGB BLD-MCNC: 13.5 G/DL (ref 12.1–17)
IMM GRANULOCYTES # BLD AUTO: 0 K/UL (ref 0–0.04)
IMM GRANULOCYTES NFR BLD AUTO: 0 % (ref 0–0.5)
LYMPHOCYTES # BLD: 1.3 K/UL (ref 0.8–3.5)
LYMPHOCYTES NFR BLD: 35 % (ref 12–49)
MCH RBC QN AUTO: 32.8 PG (ref 26–34)
MCHC RBC AUTO-ENTMCNC: 33.4 G/DL (ref 30–36.5)
MCV RBC AUTO: 98.3 FL (ref 80–99)
MONOCYTES # BLD: 0.5 K/UL (ref 0–1)
MONOCYTES NFR BLD: 12 % (ref 5–13)
NEUTS SEG # BLD: 1.9 K/UL (ref 1.8–8)
NEUTS SEG NFR BLD: 50 % (ref 32–75)
NRBC # BLD: 0 K/UL (ref 0–0.01)
NRBC BLD-RTO: 0 PER 100 WBC
PLATELET # BLD AUTO: 161 K/UL (ref 150–400)
PMV BLD AUTO: 12.1 FL (ref 8.9–12.9)
RBC # BLD AUTO: 4.11 M/UL (ref 4.1–5.7)
WBC # BLD AUTO: 3.8 K/UL (ref 4.1–11.1)

## 2024-11-25 PROCEDURE — 3017F COLORECTAL CA SCREEN DOC REV: CPT | Performed by: INTERNAL MEDICINE

## 2024-11-25 PROCEDURE — G8484 FLU IMMUNIZE NO ADMIN: HCPCS | Performed by: INTERNAL MEDICINE

## 2024-11-25 PROCEDURE — G8427 DOCREV CUR MEDS BY ELIG CLIN: HCPCS | Performed by: INTERNAL MEDICINE

## 2024-11-25 PROCEDURE — 99213 OFFICE O/P EST LOW 20 MIN: CPT | Performed by: INTERNAL MEDICINE

## 2024-11-25 PROCEDURE — G8420 CALC BMI NORM PARAMETERS: HCPCS | Performed by: INTERNAL MEDICINE

## 2024-11-25 PROCEDURE — 1036F TOBACCO NON-USER: CPT | Performed by: INTERNAL MEDICINE

## 2024-11-25 PROCEDURE — 1123F ACP DISCUSS/DSCN MKR DOCD: CPT | Performed by: INTERNAL MEDICINE

## 2024-11-25 NOTE — PROGRESS NOTES
Patient identified by name and date of birth  Wayne Rose is a 74 y.o. male   Chief Complaint   Patient presents with    Follow-up      Vitals:    11/25/24 1010   BP: 119/66   Site: Left Upper Arm   Pulse: (!) 47   SpO2: 99%   Weight: 68.5 kg (151 lb)   Height: 1.778 m (5' 10\")       No LMP for male patient.        \"Have you been to the ER, urgent care clinic since your last visit?  Hospitalized since your last visit?\"    NO    “Have you seen or consulted any other health care providers outside of [facility] since your last visit?”    NO      
electronic medical record.  Objective:     Vitals:    11/25/24 1010   BP: 119/66   Pulse: (!) 47   SpO2: 99%     ECOG PS: 0- Fully active, able to carry on all pre-disease performance without restriction.  Physical Exam  Constitutional: No acute distress. and Non-toxic appearance.  Eyes: Anicteric sclerae.  Cardiovascular: S1,S2 auscultated. No pitting edema.  Pulmonary: Normal Respiratory Effort. No wheezing. No rhonchi. No rales.   Abdominal: Normal bowel sounds. Soft Abdomen to palpation. No abdominal tenderness.   Skin: No jaundice. No rash.   Neurological: Alert and oriented. No tremor on inspection.   Normal Gait.     Results:      I personally reviewed Epic EHR labs/results below:    Lab Results   Component Value Date    WBC 3.4 (L) 11/06/2023    HGB 12.4 11/06/2023    HCT 37.2 11/06/2023    MCV 97.9 11/06/2023     11/06/2023     No results found for: \"NA\", \"K\", \"CL\", \"CO2\", \"BUN\", \"CREATININE\", \"GLUCOSE\", \"CALCIUM\", \"LABGLOM\"   Lab Results   Component Value Date/Time    BILITOT 0.5 07/12/2021 10:20 AM    BILIDIR 0.1 07/12/2021 10:20 AM     Assessment and Recommendations:     1. Other neutropenia (HCC)  --- has only had mild history of neutropenia.  --- check CBC and if he would like to follow with Dr. Cook     2. Thrombocytopenia (HCC)  --- only mild thrombocytopenia history. Okay to proceed with CBC but if he would like then okay to follow with PCP if PLT's remain greater than 100K.    Reviewed note from [facility] from 11/14/24 where it is noted that he will go for a dual chamber pacemaker 12/9/2024. Will check Labs:    - CBC with Auto Differential; Future      FOLLOW-UP:  Return in about 1 year (around 11/25/2025), or if symptoms worsen or fail to improve.  However, also reasonable for patient to follow-up in primary care as well.    Signed By:   MD Wayne Smith MD  Hematology/Medical Oncology Provider  [facility]